# Patient Record
Sex: MALE | Race: WHITE | NOT HISPANIC OR LATINO | Employment: OTHER | URBAN - METROPOLITAN AREA
[De-identification: names, ages, dates, MRNs, and addresses within clinical notes are randomized per-mention and may not be internally consistent; named-entity substitution may affect disease eponyms.]

---

## 2024-08-15 ENCOUNTER — OFFICE VISIT (OUTPATIENT)
Dept: UROLOGY | Facility: CLINIC | Age: 84
End: 2024-08-15
Payer: MEDICARE

## 2024-08-15 VITALS
WEIGHT: 219.81 LBS | BODY MASS INDEX: 35.32 KG/M2 | HEIGHT: 66 IN | DIASTOLIC BLOOD PRESSURE: 70 MMHG | RESPIRATION RATE: 18 BRPM | HEART RATE: 66 BPM | SYSTOLIC BLOOD PRESSURE: 153 MMHG

## 2024-08-15 DIAGNOSIS — R31.0 GROSS HEMATURIA: Primary | ICD-10-CM

## 2024-08-15 DIAGNOSIS — N30.01 ACUTE CYSTITIS WITH HEMATURIA: ICD-10-CM

## 2024-08-15 DIAGNOSIS — N28.1 ACQUIRED CYST OF KIDNEY: ICD-10-CM

## 2024-08-15 DIAGNOSIS — N40.0 BPH WITHOUT URINARY OBSTRUCTION: ICD-10-CM

## 2024-08-15 DIAGNOSIS — R31.29 OTHER MICROSCOPIC HEMATURIA: ICD-10-CM

## 2024-08-15 DIAGNOSIS — R35.1 NOCTURIA: ICD-10-CM

## 2024-08-15 LAB
BILIRUB UR QL STRIP: POSITIVE
GLUCOSE UR QL STRIP: NEGATIVE
KETONES UR QL STRIP: POSITIVE
LEUKOCYTE ESTERASE UR QL STRIP: NEGATIVE
PH, POC UA: 5
POC BLOOD, URINE: POSITIVE
POC NITRATES, URINE: NEGATIVE
POC RESIDUAL URINE VOLUME: 95 ML (ref 0–100)
PROT UR QL STRIP: POSITIVE
SP GR UR STRIP: >=1.03 (ref 1–1.03)
UROBILINOGEN UR STRIP-ACNC: 0.2 (ref 0.3–2.2)

## 2024-08-15 PROCEDURE — 87086 URINE CULTURE/COLONY COUNT: CPT | Performed by: UROLOGY

## 2024-08-15 PROCEDURE — 99999 PR PBB SHADOW E&M-NEW PATIENT-LVL III: CPT | Mod: PBBFAC,,, | Performed by: UROLOGY

## 2024-08-15 NOTE — PROGRESS NOTES
Subjective:       Patient ID: Mason Holt is a 83 y.o. male.    Chief Complaint: Hematuria      History of Present Illness:     Mr Holt says he is having gross hematuria for the last 2 days.  He says he is not a smoker and he has no smoking history.  He says he is not taking a blood thinner.  No dysuria.  Nocturia X 2-3.  Normal urinary flow.  He has BPH and has a history of a TURP by a Urologist in [place] several years ago.        Hematuria  Pertinent negatives include no chills, dysuria, fever, flank pain, nausea or vomiting.        History reviewed. No pertinent past medical history.  No family history on file.  Social History     Socioeconomic History    Marital status:    Tobacco Use    Smoking status: Never    Smokeless tobacco: Never   Substance and Sexual Activity    Alcohol use: Never    Drug use: Never    Sexual activity: Yes     Partners: Female     Social Determinants of Health     Financial Resource Strain: Low Risk  (8/1/2023)    [facility]    Overall Financial Resource Strain (CARDIA)     Difficulty of Paying Living Expenses: Not hard at all    [facility]    Food Insecurity   Transportation Needs: No Transportation Needs (8/1/2023)    [facility]    PRAPARE - Transportation     Lack of Transportation (Medical): No     Lack of Transportation (Non-Medical): No   Physical Activity: Unknown (8/1/2023)    Received from [facility] and Affiliates    Exercise Vital Sign     Days of Exercise per Week: 0 days   Stress: No Stress Concern Present (8/1/2023)    Received from [facility] Its Subsidiaries and Affiliates    [employer] - Occupational Stress Questionnaire      "Feeling of Stress : Not at all    Received from [facility]    Housing Stability     No outpatient encounter medications on file as of 8/15/2024.     No facility-administered encounter medications on file as of 8/15/2024.        Review of Systems   Constitutional:  Negative for chills and fever.   Respiratory:  Negative for shortness of breath.    Cardiovascular:  Negative for chest pain.   Gastrointestinal:  Negative for nausea and vomiting.   Genitourinary:  Positive for hematuria. Negative for difficulty urinating, dysuria and flank pain.   Musculoskeletal:  Negative for back pain.   Skin:  Negative for rash.   Neurological:  Negative for dizziness.   Psychiatric/Behavioral:  Negative for agitation.        Objective:     BP (!) 153/70   Pulse 66   Resp 18   Ht 5' 6" (1.676 m)   Wt 99.7 kg (219 lb 12.8 oz)   BMI 35.48 kg/m²     Physical Exam  Constitutional:       Appearance: Normal appearance.   Pulmonary:      Effort: Pulmonary effort is normal.   Abdominal:      Palpations: Abdomen is soft.   Neurological:      Mental Status: He is alert and oriented to person, place, and time.   Psychiatric:         Mood and Affect: Mood normal.         Office Visit on 08/15/2024   Component Date Value Ref Range Status    POC Blood, Urine 08/15/2024 Positive (A)  Negative Final    POC Bilirubin, Urine 08/15/2024 Positive (A)  Negative Final    POC Urobilinogen, Urine 08/15/2024 0.2 (A)  0.3 - 2.2 Final    POC Ketones, Urine 08/15/2024 Positive (A)  Negative Final    POC Protein, Urine 08/15/2024 Positive (A)  Negative Final    POC Nitrates, Urine 08/15/2024 Negative  Negative Final    POC Glucose, Urine 08/15/2024 Negative  Negative Final    pH, UA 08/15/2024 5.0   Final    POC Specific Gravity, Urine 08/15/2024 >=1.030  1.003 - 1.029 Final    POC Leukocytes, Urine 08/15/2024 Negative  Negative Final    POC Residual Urine Volume 08/15/2024 95  0 - 100 mL Final        Results for orders placed or performed " in visit on 08/15/24 (from the past 8760 hour(s))   POCT Bladder Scan   Result Value    POC Residual Urine Volume 95        Assessment:       1. Gross hematuria    2. Acute cystitis with hematuria    3. Other microscopic hematuria    4. Nocturia    5. BPH without urinary obstruction    6. Acquired cyst of kidney      Plan:     Orders Placed This Encounter    CULTURE, URINE    POCT Urinalysis, Dipstick, Automated, W/O Scope    POCT Bladder Scan         6-10-24  MRI abdomen with and without IV contrast.  BRG.  See report.  Bilateral benign simple and proteinaceous renal cysts measuring up to 2.4 cm at the upper pole of the left kidney. The largest proteinaceous cysts are at the upper pole of the right kidney measuring up to 1.2 cm. No hydronephrosis.  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 Recommend one-year surveillance MRI.     10-31-23  CT abdomen/pelvis with IV contrast.  OLOL.  See report.  No suspicious renal lesion is noted. There are postop changes of TURP. No pelvic mass is detected.     I reviewed the above imaging results.         1-3-24  PSA 3.67    12-13-23  PSA 3.6    7-19-23  PSA 3.1    1-25-24  Cr 0.72    1-3-24  HgbA1c 5.9    I reviewed the above lab results.         Assessment:  - Gross hematuria for the last 2 days.  He says he is not a smoker and he has no smoking history.  He says he is not taking a blood thinner.  - Large amount of microscopic hematuria today on 8-15-24.  - BPH without significant LUTS.  History of a TURP by a Urologist in [place] several years ago.  - Nocturia.  - He says he has a history of kidney stones.    Plan:  - Urine culture today.  - I discussed the option of starting him on a prostate medication such as finasteride and he wants to hold off on starting on a prostate medication at this time.  - I discussed dietary modifications with him today and I recommended he drink mostly water during the day.  - I recommended he limit his  fluid intake at night to small amounts of water and to void just prior to bedtime.   - RTC on 8-26-24 for a cystoscopy.

## 2024-08-17 LAB — BACTERIA UR CULT: NORMAL

## 2024-08-26 ENCOUNTER — PROCEDURE VISIT (OUTPATIENT)
Facility: CLINIC | Age: 84
End: 2024-08-26
Payer: MEDICARE

## 2024-08-26 DIAGNOSIS — R31.0 GROSS HEMATURIA: ICD-10-CM

## 2024-08-26 DIAGNOSIS — R39.12 WEAK URINARY STREAM: ICD-10-CM

## 2024-08-26 DIAGNOSIS — N13.8 ENLARGED PROSTATE WITH URINARY OBSTRUCTION: Primary | ICD-10-CM

## 2024-08-26 DIAGNOSIS — Z01.818 PREOP TESTING: ICD-10-CM

## 2024-08-26 DIAGNOSIS — N40.1 ENLARGED PROSTATE WITH URINARY OBSTRUCTION: Primary | ICD-10-CM

## 2024-08-26 PROCEDURE — 88112 CYTOPATH CELL ENHANCE TECH: CPT | Performed by: PATHOLOGY

## 2024-08-26 PROCEDURE — 99214 OFFICE O/P EST MOD 30 MIN: CPT | Mod: 25,S$GLB,, | Performed by: UROLOGY

## 2024-08-26 PROCEDURE — 52000 CYSTOURETHROSCOPY: CPT | Mod: S$GLB,,, | Performed by: UROLOGY

## 2024-08-26 RX ORDER — FINASTERIDE 5 MG/1
5 TABLET, FILM COATED ORAL DAILY
Qty: 90 TABLET | Refills: 3 | Status: SHIPPED | OUTPATIENT
Start: 2024-08-26 | End: 2025-08-26

## 2024-08-26 RX ORDER — CEFAZOLIN SODIUM 2 G/50ML
2 SOLUTION INTRAVENOUS
OUTPATIENT
Start: 2024-08-26

## 2024-08-26 NOTE — PROCEDURES
Cystoscopy    Date/Time: 8/26/2024 10:30 AM    Performed by: Dave Mills MD  Authorized by: Dave Mills MD    Consent Done?:  Yes (Verbal) and Yes (Written)  Timeout: prior to procedure the correct patient, procedure, and site was verified    Prep: patient was prepped and draped in usual sterile fashion    Local anesthesia used?: Yes    Anesthesia:  Lidocaine jelly  Indications: hematuria    Position:  Supine  Anesthesia:  Lidocaine jelly  Preparation: Patient was prepped and draped in usual sterile fashion    Scope type:  Flexible cystoscope   patient tolerated the procedure well with no immediate complications  Comments:      The flexible cystoscope was advanced through his urethra into his bladder.  The bladder was inspected in a systematic fashion.  His bilateral ureteral orifices are orthotopic and patent with clear efflux of urine.  No bladder tumor, no urothelial lesion, no stone, and no foreign body is seen.  Mild bladder trabeculations.  His prostate is obstructing appearing and is vascular.  Moderately enlarged median lobe of his prostate.  Significantly enlarged lateral lobes of his prostate. A bladder wash urine cytology was obtained.  The cystoscope was removed from his bladder.  The patient tolerated this procedure well.             6-10-24  MRI abdomen with and without IV contrast.  BRG.  See report.  Bilateral benign simple and proteinaceous renal cysts measuring up to 2.4 cm at the upper pole of the left kidney. The largest proteinaceous cysts are at the upper pole of the right kidney measuring up to 1.2 cm. No hydronephrosis.  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 Recommend one-year surveillance MRI.      10-31-23  CT abdomen/pelvis with IV contrast.  OLOL.  See report.  No suspicious renal lesion is noted. There are postop changes of TURP. No pelvic mass is detected.     7-25-23  CT urogram.  MARIA INES.  See report.  5 mm non obstructing stone  within the right ureterovesical junction.  A couple of tiny punctate calyceal stones are visible in both kidneys.  Multiple cysts of varying size and proteinaceous content on both kidneys.  No definitive solid renal mass. Markedly enlarged prostate gland.     I reviewed the above imaging results.            1-3-24  PSA 3.67     12-13-23  PSA 3.6     7-19-23  PSA 3.1     1-25-24  Cr 0.72     1-3-24  HgbA1c 5.9     I reviewed the above lab results.            Assessment:  - Gross hematuria for a few days in mid August 2024 that has currently resolved.  Persistent microscopic hematuria.  Cystoscopy today on 8-26-24 shows an obstructing and vascular appearing trilobar hypertrophy prostate.  He is not a smoker and he has no smoking history.  He says he is not taking a blood thinner.  - BPH with LUTS.  History of a TURP in 2017 by an outside Urologist, Dr Gurjit Reyes in [place], [place].  30 grams of prostate tissue in the specimen and no prostate cancer was seen.  - Slow urinary stream.  - Nocturia.  - Prostate cancer.  He has been on active surveillance since 2010.  Prostate biopsy by an outside Urologist in 2010 showed a single core of jhon 3+2=5.  - History of a CT urogram on 7-25-23 that showed a 5 mm non obstructing stone within the right ureterovesical junction.  A couple of tiny punctate calyceal stones are visible in both kidneys.  S/p right USE with laser lithotripsy, cystoscopy, right ureteral stent placement by me on 8-2-23.  His right ureteral stent was removed cystoscopically by me on 8-9-23.  - History of kidney stones.     Plan:  - I had a discussion with the patient today on 8-26-24 after his cystoscopy his options regarding his BPH with LUTS and gross hematuria which likely is from the prostate including proceeding to the OR for a prostate procedure such as a transurethral laser vaporization of his prostate and/or transurethral resection of his prostate. Risks and benefits of the surgery was explained to  the patient today including but not limited to the risks of failure of the surgery with the continued urinary symptoms, and possibly worsening urinary symptoms, bleeding during or after surgery, infection, urinary incontinence, need for further surgery in the future, medical complications, and complications from Anesthesia.  I answered all of his questions to his apparent understanding and to his apparent satisfaction. The patient says that he would like to proceed with surgical intervention with a transurethral laser vaporization of his prostate with a possible need for a transurethral resection of his prostate depending on intraoperative findings.  - Started finasteride 5 mg 1 PO qdaily today on 8-26-24.  - Bladder wash urine cytology.  - I discussed dietary modifications with him today and I recommended he drink mostly water during the day.   - Preop testing this week.  - RTC on 9-9-24 with a PVR on arrival.

## 2024-08-27 DIAGNOSIS — Z01.818 PRE-OP EXAM: Primary | ICD-10-CM

## 2024-08-28 LAB
FINAL PATHOLOGIC DIAGNOSIS: ABNORMAL
Lab: ABNORMAL

## 2024-08-30 ENCOUNTER — OFFICE VISIT (OUTPATIENT)
Dept: INTERNAL MEDICINE | Facility: CLINIC | Age: 84
End: 2024-08-30
Payer: MEDICARE

## 2024-08-30 ENCOUNTER — HOSPITAL ENCOUNTER (OUTPATIENT)
Dept: CARDIOLOGY | Facility: HOSPITAL | Age: 84
Discharge: HOME | End: 2024-08-30
Attending: UROLOGY
Payer: MEDICARE

## 2024-08-30 ENCOUNTER — HOSPITAL ENCOUNTER (OUTPATIENT)
Dept: RADIOLOGY | Facility: HOSPITAL | Age: 84
Discharge: HOME | End: 2024-08-30
Attending: UROLOGY
Payer: MEDICARE

## 2024-08-30 VITALS
DIASTOLIC BLOOD PRESSURE: 68 MMHG | OXYGEN SATURATION: 96 % | RESPIRATION RATE: 18 BRPM | TEMPERATURE: 97 F | HEART RATE: 66 BPM | SYSTOLIC BLOOD PRESSURE: 156 MMHG

## 2024-08-30 DIAGNOSIS — N40.1 ENLARGED PROSTATE WITH URINARY OBSTRUCTION: Primary | ICD-10-CM

## 2024-08-30 DIAGNOSIS — Z01.818 PREOP TESTING: ICD-10-CM

## 2024-08-30 DIAGNOSIS — N13.8 ENLARGED PROSTATE WITH URINARY OBSTRUCTION: Primary | ICD-10-CM

## 2024-08-30 DIAGNOSIS — R03.0 BLOOD PRESSURE ELEVATED WITHOUT HISTORY OF HTN: ICD-10-CM

## 2024-08-30 DIAGNOSIS — Z01.818 PRE-OP EXAM: ICD-10-CM

## 2024-08-30 PROBLEM — N40.0 BPH (BENIGN PROSTATIC HYPERPLASIA): Status: ACTIVE | Noted: 2024-08-30

## 2024-08-30 LAB
OHS QRS DURATION: 96 MS
OHS QTC CALCULATION: 435 MS

## 2024-08-30 PROCEDURE — 71046 X-RAY EXAM CHEST 2 VIEWS: CPT | Mod: 26,,, | Performed by: RADIOLOGY

## 2024-08-30 PROCEDURE — 93010 ELECTROCARDIOGRAM REPORT: CPT | Mod: ,,, | Performed by: INTERNAL MEDICINE

## 2024-08-30 PROCEDURE — 71046 X-RAY EXAM CHEST 2 VIEWS: CPT | Mod: TC

## 2024-08-30 PROCEDURE — 93005 ELECTROCARDIOGRAM TRACING: CPT

## 2024-08-30 PROCEDURE — 99999 PR PBB SHADOW E&M-EST. PATIENT-LVL III: CPT | Mod: PBBFAC,,,

## 2024-08-30 NOTE — PROGRESS NOTES
Preoperative History and Physical                                                           [facility]                                                                   Chief Complaint: Preoperative evaluation     History of Present Illness:      Mason Holt is a 83 y.o. male who presents to the office today for a preoperative consultation at the request of Dr. Mills who plans on performing a TURP, USING THULIUM LASER on September 17.     Functional Status:      The patient is able to climb a flight of stairs. The patient is able to ambulate independently without difficulty. The patient's functional status is not affected by the surgical problem. The patient's functional status is not affected by shortness of breath, chest pain, dyspnea on exertion and fatigue.    MET score greater than 4    Patient Anesthesia History:      History of Malignant Hyperthermia: no  History of Pseudocholinesterase Deficiency: no  History of PONV: no  History of difficult intubation: no  History of delayed emergence: no  History of high fever after anesthesia: no    Family Anesthesia History:      History of Malignant Hyperthermia: no  History of Pseudocholinesterase Deficiency: no    Past Medical History:      Past Medical History:   Diagnosis Date    BPH (benign prostatic hyperplasia)         Past Surgical History:      Past Surgical History:   Procedure Laterality Date    BACK SURGERY  2020    - lower back surgery    CATARACT EXTRACTION Bilateral     L - 2024 and R 2022    EYE SURGERY      PROSTATE BIOPSY  2019        Social History:      Social History     Socioeconomic History    Marital status:    Tobacco Use    Smoking status: Never    Smokeless tobacco: Never   Substance and Sexual Activity    Alcohol use: Never    Drug use: Never    Sexual activity: Yes     Partners: Female     Social Determinants of Health     Financial Resource Strain: Low Risk  (8/1/2023)     [facility]    Overall Financial Resource Strain (CARDIA)     Difficulty of Paying Living Expenses: Not hard at all    Received from [facility]    Food Insecurity   Transportation Needs: No Transportation Needs (8/1/2023)    Received from [facility]    PRAPARE - Transportation     Lack of Transportation (Medical): No     Lack of Transportation (Non-Medical): No   Physical Activity: Unknown (8/1/2023)    Received from [facility]    Exercise Vital Sign     Days of Exercise per Week: 0 days   Stress: No Stress Concern Present (8/1/2023)    Received from [facility]    [employer] - Occupational Stress Questionnaire     Feeling of Stress : Not at all    Received from [facility]    Housing Stability        Family History:      No family history on file.    Allergies:      Review of patient's allergies indicates:  No Known Allergies    Medications:      Current Outpatient Medications   Medication Sig    finasteride (PROSCAR) 5 mg tablet Take 1 tablet (5 mg total) by mouth once daily.     No current facility-administered medications for this visit.       Vitals:      Vitals:    08/30/24 1320   BP: (!) 156/68   Pulse: 66   Resp: 18   Temp: 97.2 °F (36.2 °C)       Review of Systems:        Constitutional: Negative for fever, chills, weight loss, malaise/fatigue and diaphoresis.   HENT: Negative for hearing loss, ear pain, nosebleeds, congestion, sore throat, neck pain, tinnitus and ear discharge.    Eyes: Negative for blurred vision, double vision, photophobia, pain, discharge and redness.   Respiratory: Negative for cough, hemoptysis, sputum production,  shortness of breath, wheezing and stridor.    Cardiovascular: Negative for chest pain, palpitations, orthopnea, claudication, leg swelling and PND.   Gastrointestinal: Negative for heartburn, nausea, vomiting, abdominal pain, diarrhea, constipation, blood in stool and melena.   Genitourinary: Negative for dysuria, urgency, frequency, and flank pain. + hematuria    Musculoskeletal: Negative for myalgias, back pain, joint pain and falls.   Skin: Negative for itching and rash.   Neurological: Negative for dizziness, tingling, tremors, sensory change, speech change, focal weakness, seizures, loss of consciousness, weakness and headaches.   Endo/Heme/Allergies: Negative for environmental allergies and polydipsia. Does not bruise/bleed easily.   Psychiatric/Behavioral: Negative for depression, suicidal ideas, hallucinations, memory loss and substance abuse. The patient is not nervous/anxious and does not have insomnia.    All 14 systems reviewed and negative except as noted above.    Physical Exam:      Constitutional: Appears well-developed, well-nourished and in no acute distress.  Patient is oriented to person, place, and time.   Head: Normocephalic and atraumatic. Mucous membranes moist.  Neck: Neck supple no mass.   Cardiovascular: Normal rate and regular rhythm.  S1 S2 appreciated by ascultation.  Pulmonary/Chest: Effort normal and clear to auscultation bilaterally. No respiratory distress.   Abdomen: Soft. Non-tender and non-distended. Bowel sounds are normal.   Neurological: Patient is alert and oriented to person, place and time. Moves all extremities.  Skin: Warm and dry. No lesions.  Extremities: No clubbing, cyanosis or edema.    Laboratory data:      Reviewed and noted in plan where applicable. Please see chart for full laboratory data.    Lab Results   Component Value Date    INR 1.1 01/08/2024    INR 1.1 09/12/2023    INR 1.0 08/28/2023       Lab Results   Component Value Date    WBC 7.41 08/30/2024    HGB  13.5 (L) 08/30/2024    HCT 44.6 08/30/2024    MCV 97 08/30/2024     08/30/2024     Comprehensive Metabolic Panel  Order: [id number]  Status: Final result   Dx: Preop testing          Component Ref Range & Units 4 d ago 7 mo ago   Sodium 136 - 145 mmol/L 141 139   Potassium 3.5 - 5.1 mmol/L 3.7    Chloride 95 - 110 mmol/L 108 107 R   CO2 23 - 29 mmol/L 23 29 R   Glucose 70 - 110 mg/dL 136 High     BUN 8 - 23 mg/dL 14 15 R   Creatinine 0.5 - 1.4 mg/dL 0.9 0.72 R   Calcium 8.7 - 10.5 mg/dL 8.9 8.3 Low  R   Total Protein 6.0 - 8.4 g/dL 6.8    Albumin 3.5 - 5.2 g/dL 3.4 Low     Total Bilirubin 0.1 - 1.0 mg/dL 0.6    Comment: For infants and newborns, interpretation of results should be based  on gestational age, weight and in agreement with clinical  observations.    Premature Infant recommended reference ranges:  Up to 24 hours.............<8.0 mg/dL  Up to 48 hours............<12.0 mg/dL  3-5 days..................<15.0 mg/dL  6-29 days.................<15.0 mg/dL   Alkaline Phosphatase 55 - 135 U/L 110    AST 10 - 40 U/L 18    ALT 10 - 44 U/L 13    eGFR >60 mL/min/1.73 m^2 >60.0    Anion Gap 8 - 16 mmol/L 10 3 Low    Resulting Agency  [facility]        Specimen Collected: 08/30/24 13:49 CDT Last Resulted: 08/30/24 21:57 CDT     Predictors of intubation difficulty:       Morbid obesity? no   Anatomically abnormal facies? no   Prominent incisors? no   Receding mandible? no   Short, thick neck? no   Neck range of motion: normal   Dentition: No chipped, loose, or missing teeth.  Based on the Modified Mallampati, patient is a mallampati score: III (soft and hard palate and base of uvula visible)    Cardiographics:      ECG: Normal sinus rhythm -Normal ECG     Echocardiogram: not indicated    Imaging:      Chest x-ray: No confluent airspace opacity or consolidation.  There is no evidence of pleural effusion, pneumothorax, or other acute pulmonary disease.  The cardiomediastinal silhouette is within  normal limits.  No acute osseous abnormality is evident. Moderate scattered degenerative change and atherosclerotic disease. No acute cardiopulmonary abnormality per imaging on 08/30/2024.        Assessment and Plan:      1. Enlarged prostate with urinary obstruction  Assessment & Plan:  TURP, USING THULIUM LASER planned per Dr. Mills on 9/17/2024    Known risk factors for perioperative complications: None    Difficulty with intubation is not anticipated.    Cardiac Risk Estimation: Based on the Revised Cardiac Risk index, patient is a Class I risk with a 3.9 % risk of a major cardiac event in a low risk procedure.    1.) Preoperative workup as follows: chest x-ray, ECG, hemoglobin, hematocrit, electrolytes, creatinine, glucose.  2.) Change in medication regimen before surgery: discontinue ASA 6 days before surgery, discontinue NSAIDs 5 days before surgery, hold Metformin 24 hours prior to surgery. Hold all vitamins/supplements for 7 days prior to surgery, with the exception of Potassium and Iron supplementation. Hold semaglutide/tirzepatide for 7 days prior to surgery.   3.) Prophylaxis for cardiac events with perioperative beta-blockers: not indicated.  4.) Invasive hemodynamic monitoring perioperatively: at the discretion of anesthesiologist.  5.) Deep vein thrombosis prophylaxis postoperatively: regimen to be chosen by surgical team.  6.) Surveillance for postoperative MI with ECG immediately postoperatively and on postoperati ve days 1 and 2 AND troponin levels 24 hours postoperatively and on day 4 or hospital discharge (whichever comes first): at the discretion of anesthesiologist.  7.) Current medications which may produce withdrawal symptoms if withheld perioperatively: None   8.) Other measures: Postoperative incentive spirometry to prevent pneumonia.     --Morning of Procedure medications: None   --Hold all other medications morning of surgery   --Resume all medications post-operatively  --Hold ASA,  NSAIDs and all vitamins/supplements 7 days prior to procedure  --Hold oral diabetes medications morning of surgery   --Hold metformin 24 hours prior to surgery   --Hold Ozempic 7 days prior to surgery   -patient reports that he was prescribed finasteride but has not initiated the medication       2. Pre-op exam  -     Ambulatory referral/consult to Pre-Admit    3. Blood pressure elevated without history of HTN  Assessment & Plan:  -/68  -patient denies taking any antihypertensive medication and states his blood pressure is usually normal  -followed outpatient by PCP               Electronically signed by Cristy Conner NP on 9/3/2024 at 1:28 PM.     5

## 2024-08-30 NOTE — DISCHARGE INSTRUCTIONS
To confirm, your doctor has instructed you: Surgery is scheduled for 09/17/2024.   Pre admit office will call the afternoon prior to surgery between 1PM and 3PM with arrival time.    Surgery will be at [facility],  The address is [address]. [place] [address].      IMPORTANT INSTRUCTIONS!    Do not eat or drink after 12 midnight, including water. Do not smoke or use chewing tobacco after 12 midnight!  OK to brush teeth, but no gum, candy, or mints!      *Take only these medicines with a small swallow of water-morning of surgery*     No Meds         ____ Stop taking all vitamins, herbal supplements, Aspirin, & NSAIDS (Ibuprofen, Advil, Aleve) 7 days prior to surgery, as these can thin your blood.    ____ Weight loss medication, such as Adipex and Phentermine, must be stopped 14 days prior to surgery, no exceptions!    *Diabetic Patients: If you take diabetic or weight loss medication, do NOT take morning of surgery unless instructed by Doctor. Metformin to be stopped 24 hrs prior to surgery. DO NOT take short-acting insulin the day of surgery. Only take HALF of your regular dose of long-acting insulin the night before surgery, unless instructed otherwise. Blood sugars will be checked in pre-op.   ~Ozempic/Mounjaro/Wegovy/Trulicity/Semaglutide injections must be stopped 7 days prior to surgery.     Please notify MD office if you develop an active infection, are prescribed antibiotics by someone other than the surgeon doing your surgery, or visit urgent care/ER.      Bathing Instructions:   The night before surgery and the morning prior to coming to the hospital:    - Shower & rinse your body as usual with anti-bacterial Soap (Dial or Lever 2000)   -Hibiclens (if indicated) use AFTER anti-bacterial soap; 1 packet PM/1 packet in AM on surgical site only   -Do not use hibiclens on your head, face, or genitals.    -Do not wash with anti-bacterial soap after you use the hibiclens.    -Do not shave  surgical site 5-7 days prior to surgery.    -Pubic hair 7 days prior to surgery (OBGYN/Urology only).       Additional Instructions:   __ No makeup, powder, lotions, creams, or body spray to skin   __ No deodorant if you are having: breast procedure, PORT, or upper shoulder surgery!   __ No nail polish or artificial nails       **SURGERY WILL BE CANCELLED IF ARTIFICIAL/NAIL POLISH IS PRESENT!!!**  __ Please remove all piercings and leave all jewelry at home.    **SURGERY WILL BE CANCELLED IF PIERCINGS ARE PRESENT!!!**    __ Dentures, Hearing Aids and Contact Lens need to be removed prior to the start of surgery.    __ Avoid Alcoholic beverages 3 days prior to surgery, as it can thin the blood, unless told otherwise by pre-admit department.  __ Females: may need to give a urine sample the morning of surgery;   **Please ask  for a specimen cup if you need to use the restroom prior to being called into pre-op.**  __ Males: Stop ED medications (Viagra, Cialis) 24 hrs prior to surgery.  __ Wear clean, loose-fitting clothing. Allow for dressings/bandages/surgical equipment   __ You must have transportation, and they MUST stay the entire time.   __  Bring photo ID and insurance information to hospital      [facility] Visitor/Ride Policy:   Only 1 adult allowed (over the age of 18) to accompany you and MUST STAY through the entire length of admission.     -Must have a ride home from a responsible adult that you know and trust.    -Medical Transport, Uber or Lyft can only be used if patient has a responsible adult to accompany them during ride home.    ~Your ride MUST STAY the entire time until you are discharged~    *If you are running late or have questions the morning of surgery, please call the Pre-OP Department @ [phone].       *Please Call [facility] Pre-Admit Department for surgery instruction questions:   [phone]       Financial Questions:                                                       Additional Payment Question Numbers:   qhel: [phone]                                                               [phone] or [phone]

## 2024-08-30 NOTE — ASSESSMENT & PLAN NOTE
TURP, USING THULIUM LASER planned per Dr. Mills on 9/17/2024    Known risk factors for perioperative complications: None    Difficulty with intubation is not anticipated.    Cardiac Risk Estimation: Based on the Revised Cardiac Risk index, patient is a Class I risk with a 3.9 % risk of a major cardiac event in a low risk procedure.    1.) Preoperative workup as follows: chest x-ray, ECG, hemoglobin, hematocrit, electrolytes, creatinine, glucose.  2.) Change in medication regimen before surgery: discontinue ASA 6 days before surgery, discontinue NSAIDs 5 days before surgery, hold Metformin 24 hours prior to surgery. Hold all vitamins/supplements for 7 days prior to surgery, with the exception of Potassium and Iron supplementation. Hold semaglutide/tirzepatide for 7 days prior to surgery.   3.) Prophylaxis for cardiac events with perioperative beta-blockers: not indicated.  4.) Invasive hemodynamic monitoring perioperatively: at the discretion of anesthesiologist.  5.) Deep vein thrombosis prophylaxis postoperatively: regimen to be chosen by surgical team.  6.) Surveillance for postoperative MI with ECG immediately postoperatively and on postoperati ve days 1 and 2 AND troponin levels 24 hours postoperatively and on day 4 or hospital discharge (whichever comes first): at the discretion of anesthesiologist.  7.) Current medications which may produce withdrawal symptoms if withheld perioperatively: None   8.) Other measures: Postoperative incentive spirometry to prevent pneumonia.     --Morning of Procedure medications: None   --Hold all other medications morning of surgery   --Resume all medications post-operatively  --Hold ASA, NSAIDs and all vitamins/supplements 7 days prior to procedure  --Hold oral diabetes medications morning of surgery   --Hold metformin 24 hours prior to surgery   --Hold Ozempic 7 days prior to surgery   -patient reports that he was prescribed finasteride but has not initiated the medication

## 2024-08-30 NOTE — ASSESSMENT & PLAN NOTE
-/68  -patient denies taking any antihypertensive medication and states his blood pressure is usually normal  -followed outpatient by PCP

## 2024-09-01 LAB
OHS QRS DURATION: 96 MS
OHS QTC CALCULATION: 435 MS

## 2024-09-04 ENCOUNTER — PATIENT MESSAGE (OUTPATIENT)
Dept: UROLOGY | Facility: CLINIC | Age: 84
End: 2024-09-04
Payer: MEDICARE

## 2024-09-04 ENCOUNTER — TELEPHONE (OUTPATIENT)
Dept: UROLOGY | Facility: CLINIC | Age: 84
End: 2024-09-04
Payer: MEDICARE

## 2024-09-04 NOTE — TELEPHONE ENCOUNTER
I contacted patient no answer, I was unable to leave voicemail. I am sending patient a portal message via portal.     ----- Message from Dave Mills MD sent at 9/3/2024 12:19 PM CDT -----  Please call Mr Holt and let him know that I reviewed his preop labs and his labs look good from a surgery standpoint.  Make sure he knows the details of his preop appointment on 9-9-24.  Make sure he knows the details of his surgery on Tuesday 9-17-24 at 7 am at the [facility].  He will need to be at the [facility] at 5:15 am prior to his surgery and nothing to eat or drink for 8 hours prior to his surgery.  Ask him if he has any questions.

## 2024-09-05 ENCOUNTER — TELEPHONE (OUTPATIENT)
Dept: PREADMISSION TESTING | Facility: HOSPITAL | Age: 84
End: 2024-09-05
Payer: MEDICARE

## 2024-09-05 NOTE — TELEPHONE ENCOUNTER
Spoke with Dr. Cheema' staff in regards to cardiac clearance. Per staff, pt has not been seen since March and needs appointment. Staff said that next available was 09/10 at 11:15. Staff sent message to patient on [employer] to confirm appointment. Called and lvm with patient to notify him of cardiac clearance.

## 2024-09-06 ENCOUNTER — TELEPHONE (OUTPATIENT)
Dept: UROLOGY | Facility: CLINIC | Age: 84
End: 2024-09-06
Payer: MEDICARE

## 2024-09-09 ENCOUNTER — OFFICE VISIT (OUTPATIENT)
Facility: CLINIC | Age: 84
End: 2024-09-09
Payer: MEDICARE

## 2024-09-09 VITALS
SYSTOLIC BLOOD PRESSURE: 150 MMHG | HEART RATE: 66 BPM | WEIGHT: 219.81 LBS | RESPIRATION RATE: 16 BRPM | DIASTOLIC BLOOD PRESSURE: 60 MMHG | HEIGHT: 66 IN | BODY MASS INDEX: 35.32 KG/M2

## 2024-09-09 DIAGNOSIS — N13.8 ENLARGED PROSTATE WITH URINARY OBSTRUCTION: Primary | ICD-10-CM

## 2024-09-09 DIAGNOSIS — R39.12 POOR URINARY STREAM: ICD-10-CM

## 2024-09-09 DIAGNOSIS — R35.1 NOCTURIA: ICD-10-CM

## 2024-09-09 DIAGNOSIS — R31.29 OTHER MICROSCOPIC HEMATURIA: ICD-10-CM

## 2024-09-09 DIAGNOSIS — N40.1 ENLARGED PROSTATE WITH URINARY OBSTRUCTION: Primary | ICD-10-CM

## 2024-09-09 DIAGNOSIS — R31.0 GROSS HEMATURIA: ICD-10-CM

## 2024-09-09 PROCEDURE — 99215 OFFICE O/P EST HI 40 MIN: CPT | Mod: S$GLB,,, | Performed by: UROLOGY

## 2024-09-09 PROCEDURE — 1126F AMNT PAIN NOTED NONE PRSNT: CPT | Mod: CPTII,S$GLB,, | Performed by: UROLOGY

## 2024-09-09 PROCEDURE — 3288F FALL RISK ASSESSMENT DOCD: CPT | Mod: CPTII,S$GLB,, | Performed by: UROLOGY

## 2024-09-09 PROCEDURE — 3077F SYST BP >= 140 MM HG: CPT | Mod: CPTII,S$GLB,, | Performed by: UROLOGY

## 2024-09-09 PROCEDURE — 1101F PT FALLS ASSESS-DOCD LE1/YR: CPT | Mod: CPTII,S$GLB,, | Performed by: UROLOGY

## 2024-09-09 PROCEDURE — 3078F DIAST BP <80 MM HG: CPT | Mod: CPTII,S$GLB,, | Performed by: UROLOGY

## 2024-09-09 PROCEDURE — 99999 PR PBB SHADOW E&M-EST. PATIENT-LVL III: CPT | Mod: PBBFAC,,, | Performed by: UROLOGY

## 2024-09-09 RX ORDER — FINASTERIDE 5 MG/1
5 TABLET, FILM COATED ORAL DAILY
Qty: 90 TABLET | Refills: 3 | Status: SHIPPED | OUTPATIENT
Start: 2024-09-09 | End: 2025-09-09

## 2024-09-09 NOTE — H&P (VIEW-ONLY)
Subjective:       Patient ID: Mason Holt is a 83 y.o. male.    Chief Complaint: Pre-op Exam      History of Present Illness:     Mr Holt has BPH with LUTS.  He underwent a TURP in 2017 by an outside Urologist, Dr Gurjit Reyes in [place], [place].  30 grams of prostate tissue in the specimen and no prostate cancer was seen.    He has a slow urinary stream.  Nocturia X 3-4.  He says he did not get the finasteride 5 mg that I prescribed him on 8-26-24.  He had gross hematuria for a few days in mid August 2024.  He is having intermittent gross hematuria but none in the last few days.  Cystoscopy on 8-26-24 showed an obstructing and vascular appearing trilobar hypertrophy prostate.  He is not a smoker and he has no smoking history.  He is not taking a blood thinner.    He has low grade prostate cancer and he has been on active surveillance since 2010.  Prostate biopsy by an outside Urologist in 2010 showed a single core of jhon 3+2=5.         Past Medical History:   Diagnosis Date    BPH (benign prostatic hyperplasia)      Family History   Problem Relation Name Age of Onset    No Known Problems Father      No Known Problems Mother      No Known Problems Maternal Aunt      No Known Problems Maternal Uncle      No Known Problems Paternal Aunt      No Known Problems Paternal Uncle      No Known Problems Paternal Grandmother      No Known Problems Paternal Grandfather      No Known Problems Maternal Grandmother      No Known Problems Maternal Grandfather       Social History     Socioeconomic History    Marital status:    Tobacco Use    Smoking status: Never    Smokeless tobacco: Never   Substance and Sexual Activity    Alcohol use: Never    Drug use: Never    Sexual activity: Yes     Partners: Female     Social Determinants of Health     Financial Resource Strain: Low Risk  (8/1/2023)    [facility]    Overall Financial  "Resource Strain (CARDIA)     Difficulty of Paying Living Expenses: Not hard at all    Received from [facility]    Food Insecurity   Transportation Needs: No Transportation Needs (8/1/2023)    [facility]    PRAPARE - Transportation     Lack of Transportation (Medical): No     Lack of Transportation (Non-Medical): No   Physical Activity: Unknown (8/1/2023)    [facility]    Exercise Vital Sign     Days of Exercise per Week: 0 days   Stress: No Stress Concern Present (8/1/2023)    [facility]    [place] [employer] - Occupational Stress Questionnaire     Feeling of Stress : Not at all    Received from [facility]    Housing Stability     Outpatient Encounter Medications as of 9/9/2024   Medication Sig Dispense Refill    finasteride (PROSCAR) 5 mg tablet Take 1 tablet (5 mg total) by mouth once daily. (Patient not taking: Reported on 9/9/2024) 90 tablet 3    finasteride (PROSCAR) 5 mg tablet Take 1 tablet (5 mg total) by mouth once daily. 90 tablet 3     No facility-administered encounter medications on file as of 9/9/2024.        Review of Systems   Constitutional:  Negative for chills and fever.   Respiratory:  Negative for shortness of breath.    Cardiovascular:  Negative for chest pain.   Gastrointestinal:  Negative for nausea and vomiting.   Genitourinary:  Positive for difficulty urinating and hematuria.   Musculoskeletal:  Negative for back pain.   Skin:  Negative for rash.   Neurological:  Negative for dizziness.   Psychiatric/Behavioral:  Negative for agitation.        Objective:     BP (!) 150/60 (BP Location: Left arm, Patient Position: Sitting)   Pulse 66   Resp 16   Ht 5' 6" (1.676 m)   Wt " 99.7 kg (219 lb 12.8 oz)   BMI 35.48 kg/m²     Physical Exam  Constitutional:       Appearance: Normal appearance.   Cardiovascular:      Rate and Rhythm: Normal rate.   Pulmonary:      Effort: Pulmonary effort is normal.   Abdominal:      Palpations: Abdomen is soft.   Neurological:      Mental Status: He is alert and oriented to person, place, and time.   Psychiatric:         Mood and Affect: Mood normal.         No visits with results within 1 Day(s) from this visit.   Latest known visit with results is:   Hospital Outpatient Visit on 08/30/2024   Component Date Value Ref Range Status    QRS Duration 08/30/2024 96  ms Final    [facility] QTC Calculation 08/30/2024 435  ms Final        Results for orders placed or performed in visit on 08/15/24 (from the past 8760 hour(s))   POCT Bladder Scan   Result Value    POC Residual Urine Volume 95        Assessment:       1. Enlarged prostate with urinary obstruction    2. Poor urinary stream    3. Nocturia    4. Other microscopic hematuria    5. Gross hematuria      Plan:     Orders Placed This Encounter    finasteride (PROSCAR) 5 mg tablet          6-10-24  MRI abdomen with and without IV contrast.  BRG.  See report.  Bilateral benign simple and proteinaceous renal cysts measuring up to 2.4 cm at the upper pole of the left kidney. The largest proteinaceous cysts are at the upper pole of the right kidney measuring up to 1.2 cm. No hydronephrosis.  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 Recommend one-year surveillance MRI.      10-31-23  CT abdomen/pelvis with IV contrast.  OLOL.  See report.  No suspicious renal lesion is noted. There are postop changes of TURP. No pelvic mass is detected.      7-25-23  CT urogram.  MARIA INES.  See report.  5 mm non obstructing stone within the right ureterovesical junction.  A couple of tiny punctate calyceal stones are visible in both kidneys.  Multiple cysts of varying size and proteinaceous content on  both kidneys.  No definitive solid renal mass. Markedly enlarged prostate gland.     I reviewed the above imaging results.            1-3-24  PSA 3.67     12-13-23  PSA 3.6     7-19-23  PSA 3.1    8-30-24  Cr 0.9.  GFR >60.     1-25-24  Cr 0.72     1-3-24  HgbA1c 5.9     I reviewed the above lab results.            Assessment:  - BPH with LUTS.  History of a TURP in 2017 by an outside Urologist, Dr Gurjit Reyes in [place], [place].  30 grams of prostate tissue in the specimen and no prostate cancer was seen.  - Slow urinary stream.  - Nocturia.  - Gross hematuria for a few days in mid August 2024.  Intermittent gross hematuria but none in the last few days.  Persistent microscopic hematuria.  Cystoscopy on 8-26-24 showed an obstructing and vascular appearing trilobar hypertrophy prostate.  He is not a smoker and he has no smoking history.  He is not taking a blood thinner.  - Prostate cancer.  He has been on active surveillance since 2010.  Prostate biopsy by an outside Urologist in 2010 showed a single core of jhon 3+2=5.  - History of a CT urogram on 7-25-23 that showed a 5 mm non obstructing stone within the right ureterovesical junction.  A couple of tiny punctate calyceal stones are visible in both kidneys.  S/p right USE with laser lithotripsy, cystoscopy, right ureteral stent placement by me on 8-2-23.  His right ureteral stent was removed cystoscopically by me on 8-9-23.  - History of kidney stones.     Plan:  - I had another discussion with the patient today of his options regarding his BPH with LUTS and gross hematuria which likely is from the prostate including proceeding to the OR for a prostate procedure such as a transurethral laser vaporization of his prostate and/or transurethral resection of his prostate. Risks and benefits of the surgery was explained to the patient today including but not limited to the risks of failure of the surgery with the continued urinary symptoms, and possibly worsening  urinary symptoms, bleeding during or after surgery, infection, urinary incontinence, need for further surgery in the future, medical complications, and complications from Anesthesia.  I answered all of his questions to his apparent understanding and to his apparent satisfaction. The patient says that he wants to proceed with surgical intervention with a transurethral laser vaporization of his prostate with a possible need for a transurethral resection of his prostate depending on intraoperative findings and any other indicated procedures.  Surgery consent was signed by the patient today.  - The mutual decision was made to continue with active surveillance of his prostate cancer.  - I prescribed him finasteride 5 mg 1 PO qdaily again today on 9-9-24.  - I discussed dietary modifications with him today and I recommended he drink mostly water during the day.

## 2024-09-09 NOTE — PROGRESS NOTES
Subjective:       Patient ID: Mason Holt is a 83 y.o. male.    Chief Complaint: Pre-op Exam      History of Present Illness:     Mr Holt has BPH with LUTS.  He underwent a TURP in 2017 by an outside Urologist, Dr Gurjit Reyes in [place], [place].  30 grams of prostate tissue in the specimen and no prostate cancer was seen.    He has a slow urinary stream.  Nocturia X 3-4.  He says he did not get the finasteride 5 mg that I prescribed him on 8-26-24.  He had gross hematuria for a few days in mid August 2024.  He is having intermittent gross hematuria but none in the last few days.  Cystoscopy on 8-26-24 showed an obstructing and vascular appearing trilobar hypertrophy prostate.  He is not a smoker and he has no smoking history.  He is not taking a blood thinner.    He has low grade prostate cancer and he has been on active surveillance since 2010.  Prostate biopsy by an outside Urologist in 2010 showed a single core of jhon 3+2=5.         Past Medical History:   Diagnosis Date    BPH (benign prostatic hyperplasia)      Family History   Problem Relation Name Age of Onset    No Known Problems Father      No Known Problems Mother      No Known Problems Maternal Aunt      No Known Problems Maternal Uncle      No Known Problems Paternal Aunt      No Known Problems Paternal Uncle      No Known Problems Paternal Grandmother      No Known Problems Paternal Grandfather      No Known Problems Maternal Grandmother      No Known Problems Maternal Grandfather       Social History     Socioeconomic History    Marital status:    Tobacco Use    Smoking status: Never    Smokeless tobacco: Never   Substance and Sexual Activity    Alcohol use: Never    Drug use: Never    Sexual activity: Yes     Partners: Female     Social Determinants of Health     Financial Resource Strain: Low Risk  (8/1/2023)    [facility] and Affiliates    Overall Financial  "Resource Strain (CARDIA)     Difficulty of Paying Living Expenses: Not hard at all    Received from Health system    Food Insecurity   Transportation Needs: No Transportation Needs (8/1/2023)    [facility]    PRAPARE - Transportation     Lack of Transportation (Medical): No     Lack of Transportation (Non-Medical): No   Physical Activity: Unknown (8/1/2023)    [facility]    Exercise Vital Sign     Days of Exercise per Week: 0 days   Stress: No Stress Concern Present (8/1/2023)    [facility]    [employer] - Occupational Stress Questionnaire     Feeling of Stress : Not at all    Received from Health system    Housing Stability     Outpatient Encounter Medications as of 9/9/2024   Medication Sig Dispense Refill    finasteride (PROSCAR) 5 mg tablet Take 1 tablet (5 mg total) by mouth once daily. (Patient not taking: Reported on 9/9/2024) 90 tablet 3    finasteride (PROSCAR) 5 mg tablet Take 1 tablet (5 mg total) by mouth once daily. 90 tablet 3     No facility-administered encounter medications on file as of 9/9/2024.        Review of Systems   Constitutional:  Negative for chills and fever.   Respiratory:  Negative for shortness of breath.    Cardiovascular:  Negative for chest pain.   Gastrointestinal:  Negative for nausea and vomiting.   Genitourinary:  Positive for difficulty urinating and hematuria.   Musculoskeletal:  Negative for back pain.   Skin:  Negative for rash.   Neurological:  Negative for dizziness.   Psychiatric/Behavioral:  Negative for agitation.        Objective:     BP (!) 150/60 (BP Location: Left arm, Patient Position: Sitting)   Pulse 66   Resp 16   Ht 5' 6" (1.676 m)   Wt " 99.7 kg (219 lb 12.8 oz)   BMI 35.48 kg/m²     Physical Exam  Constitutional:       Appearance: Normal appearance.   Cardiovascular:      Rate and Rhythm: Normal rate.   Pulmonary:      Effort: Pulmonary effort is normal.   Abdominal:      Palpations: Abdomen is soft.   Neurological:      Mental Status: He is alert and oriented to person, place, and time.   Psychiatric:         Mood and Affect: Mood normal.         No visits with results within 1 Day(s) from this visit.   Latest known visit with results is:   Hospital Outpatient Visit on 08/30/2024   Component Date Value Ref Range Status    QRS Duration 08/30/2024 96  ms Final    [facility] QTC Calculation 08/30/2024 435  ms Final        Results for orders placed or performed in visit on 08/15/24 (from the past 8760 hour(s))   POCT Bladder Scan   Result Value    POC Residual Urine Volume 95        Assessment:       1. Enlarged prostate with urinary obstruction    2. Poor urinary stream    3. Nocturia    4. Other microscopic hematuria    5. Gross hematuria      Plan:     Orders Placed This Encounter    finasteride (PROSCAR) 5 mg tablet          6-10-24  MRI abdomen with and without IV contrast.  BRG.  See report.  Bilateral benign simple and proteinaceous renal cysts measuring up to 2.4 cm at the upper pole of the left kidney. The largest proteinaceous cysts are at the upper pole of the right kidney measuring up to 1.2 cm. No hydronephrosis.  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 Recommend one-year surveillance MRI.      10-31-23  CT abdomen/pelvis with IV contrast.  OLOL.  See report.  No suspicious renal lesion is noted. There are postop changes of TURP. No pelvic mass is detected.      7-25-23  CT urogram.  MARIA INES.  See report.  5 mm non obstructing stone within the right ureterovesical junction.  A couple of tiny punctate calyceal stones are visible in both kidneys.  Multiple cysts of varying size and proteinaceous content on  both kidneys.  No definitive solid renal mass. Markedly enlarged prostate gland.     I reviewed the above imaging results.            1-3-24  PSA 3.67     12-13-23  PSA 3.6     7-19-23  PSA 3.1    8-30-24  Cr 0.9.  GFR >60.     1-25-24  Cr 0.72     1-3-24  HgbA1c 5.9     I reviewed the above lab results.            Assessment:  - BPH with LUTS.  History of a TURP in 2017 by an outside Urologist, Dr Gurjit Reyes in [place], [place].  30 grams of prostate tissue in the specimen and no prostate cancer was seen.  - Slow urinary stream.  - Nocturia.  - Gross hematuria for a few days in mid August 2024.  Intermittent gross hematuria but none in the last few days.  Persistent microscopic hematuria.  Cystoscopy on 8-26-24 showed an obstructing and vascular appearing trilobar hypertrophy prostate.  He is not a smoker and he has no smoking history.  He is not taking a blood thinner.  - Prostate cancer.  He has been on active surveillance since 2010.  Prostate biopsy by an outside Urologist in 2010 showed a single core of jhon 3+2=5.  - History of a CT urogram on 7-25-23 that showed a 5 mm non obstructing stone within the right ureterovesical junction.  A couple of tiny punctate calyceal stones are visible in both kidneys.  S/p right USE with laser lithotripsy, cystoscopy, right ureteral stent placement by me on 8-2-23.  His right ureteral stent was removed cystoscopically by me on 8-9-23.  - History of kidney stones.     Plan:  - I had another discussion with the patient today of his options regarding his BPH with LUTS and gross hematuria which likely is from the prostate including proceeding to the OR for a prostate procedure such as a transurethral laser vaporization of his prostate and/or transurethral resection of his prostate. Risks and benefits of the surgery was explained to the patient today including but not limited to the risks of failure of the surgery with the continued urinary symptoms, and possibly worsening  urinary symptoms, bleeding during or after surgery, infection, urinary incontinence, need for further surgery in the future, medical complications, and complications from Anesthesia.  I answered all of his questions to his apparent understanding and to his apparent satisfaction. The patient says that he wants to proceed with surgical intervention with a transurethral laser vaporization of his prostate with a possible need for a transurethral resection of his prostate depending on intraoperative findings and any other indicated procedures.  Surgery consent was signed by the patient today.  - The mutual decision was made to continue with active surveillance of his prostate cancer.  - I prescribed him finasteride 5 mg 1 PO qdaily again today on 9-9-24.  - I discussed dietary modifications with him today and I recommended he drink mostly water during the day.

## 2024-09-16 ENCOUNTER — ANESTHESIA EVENT (OUTPATIENT)
Dept: SURGERY | Facility: HOSPITAL | Age: 84
End: 2024-09-16
Payer: MEDICARE

## 2024-09-16 NOTE — ANESTHESIA PREPROCEDURE EVALUATION
09/16/2024  Mason Holt is a 83 y.o., male.  Past Medical History:   Diagnosis Date    BPH (benign prostatic hyperplasia)      Past Surgical History:   Procedure Laterality Date    BACK SURGERY  2020    - lower back surgery    CATARACT EXTRACTION Bilateral     L - 2024 and R 2022    EYE SURGERY      PROSTATE BIOPSY  2019         Pre-op Assessment    I have reviewed the Patient Summary Reports.    I have reviewed the NPO Status.   I have reviewed the Medications.     Review of Systems  Anesthesia Hx:   History of prior surgery of interest to airway management or planning:            Denies Personal Hx of Anesthesia complications.                    Social:  Non-Smoker       Hematology/Oncology:       -- Anemia:                                  Cardiovascular:  Cardiovascular Normal                                            Pulmonary:  Pulmonary Normal                       Renal/:    BPH              Hepatic/GI:  Hepatic/GI Normal                 Endocrine:  Endocrine Normal                Physical Exam  General: Well nourished    Airway:  Mallampati: II   Mouth Opening: Normal  TM Distance: Normal  Tongue: Normal  Neck ROM: Normal ROM    Dental:  Intact        Anesthesia Plan  Type of Anesthesia, risks & benefits discussed:    Anesthesia Type: Gen Supraglottic Airway, Gen Natural Airway, MAC, Gen ETT  Intra-op Monitoring Plan: Standard ASA Monitors  Post Op Pain Control Plan: multimodal analgesia and IV/PO Opioids PRN  Induction:  IV  Informed Consent: Informed consent signed with the Patient and all parties understand the risks and agree with anesthesia plan.  All questions answered. Patient consented to blood products? No  ASA Score: 2  Day of Surgery Review of History & Physical: H&P Update referred to the surgeon/provider.    Ready For Surgery From Anesthesia Perspective.     .

## 2024-09-17 ENCOUNTER — ANESTHESIA (OUTPATIENT)
Dept: SURGERY | Facility: HOSPITAL | Age: 84
End: 2024-09-17
Payer: MEDICARE

## 2024-09-17 ENCOUNTER — HOSPITAL ENCOUNTER (OUTPATIENT)
Facility: HOSPITAL | Age: 84
Discharge: HOME | End: 2024-09-17
Attending: UROLOGY | Admitting: UROLOGY
Payer: MEDICARE

## 2024-09-17 VITALS
OXYGEN SATURATION: 95 % | SYSTOLIC BLOOD PRESSURE: 157 MMHG | TEMPERATURE: 98 F | WEIGHT: 218.81 LBS | BODY MASS INDEX: 35.17 KG/M2 | HEIGHT: 66 IN | DIASTOLIC BLOOD PRESSURE: 73 MMHG | HEART RATE: 73 BPM | RESPIRATION RATE: 16 BRPM

## 2024-09-17 DIAGNOSIS — N13.8 ENLARGED PROSTATE WITH URINARY OBSTRUCTION: ICD-10-CM

## 2024-09-17 DIAGNOSIS — N40.1 ENLARGED PROSTATE WITH URINARY OBSTRUCTION: ICD-10-CM

## 2024-09-17 PROCEDURE — 63600175 PHARM REV CODE 636 W HCPCS: Performed by: NURSE ANESTHETIST, CERTIFIED REGISTERED

## 2024-09-17 PROCEDURE — 27200651 HC AIRWAY, LMA: Performed by: ANESTHESIOLOGY

## 2024-09-17 PROCEDURE — 52648 LASER SURGERY OF PROSTATE: CPT | Mod: ,,, | Performed by: UROLOGY

## 2024-09-17 PROCEDURE — 25000003 PHARM REV CODE 250: Performed by: NURSE ANESTHETIST, CERTIFIED REGISTERED

## 2024-09-17 PROCEDURE — 25000003 PHARM REV CODE 250: Performed by: UROLOGY

## 2024-09-17 RX ORDER — LIDOCAINE HYDROCHLORIDE 20 MG/ML
INJECTION INTRAVENOUS
Status: DISCONTINUED | OUTPATIENT
Start: 2024-09-17 | End: 2024-09-17

## 2024-09-17 RX ORDER — MEPERIDINE HYDROCHLORIDE 25 MG/ML
12.5 INJECTION INTRAMUSCULAR; INTRAVENOUS; SUBCUTANEOUS ONCE AS NEEDED
Status: DISCONTINUED | OUTPATIENT
Start: 2024-09-17 | End: 2024-09-17 | Stop reason: HOSPADM

## 2024-09-17 RX ORDER — CIPROFLOXACIN 2 MG/ML
400 INJECTION, SOLUTION INTRAVENOUS
Status: DISCONTINUED | OUTPATIENT
Start: 2024-09-17 | End: 2024-09-17 | Stop reason: HOSPADM

## 2024-09-17 RX ORDER — SODIUM CHLORIDE 0.9 % (FLUSH) 0.9 %
10 SYRINGE (ML) INJECTION
Status: DISCONTINUED | OUTPATIENT
Start: 2024-09-17 | End: 2024-09-17 | Stop reason: HOSPADM

## 2024-09-17 RX ORDER — EPHEDRINE SULFATE 50 MG/ML
INJECTION, SOLUTION INTRAVENOUS
Status: DISCONTINUED | OUTPATIENT
Start: 2024-09-17 | End: 2024-09-17

## 2024-09-17 RX ORDER — ACETAMINOPHEN 325 MG/1
650 TABLET ORAL EVERY 4 HOURS PRN
Status: DISCONTINUED | OUTPATIENT
Start: 2024-09-17 | End: 2024-09-17 | Stop reason: HOSPADM

## 2024-09-17 RX ORDER — HYDROCODONE BITARTRATE AND ACETAMINOPHEN 5; 325 MG/1; MG/1
1 TABLET ORAL EVERY 6 HOURS PRN
Status: DISCONTINUED | OUTPATIENT
Start: 2024-09-17 | End: 2024-09-17 | Stop reason: HOSPADM

## 2024-09-17 RX ORDER — HYDROMORPHONE HYDROCHLORIDE 2 MG/ML
1 INJECTION, SOLUTION INTRAMUSCULAR; INTRAVENOUS; SUBCUTANEOUS EVERY 6 HOURS PRN
Status: DISCONTINUED | OUTPATIENT
Start: 2024-09-17 | End: 2024-09-17 | Stop reason: HOSPADM

## 2024-09-17 RX ORDER — PHENAZOPYRIDINE HYDROCHLORIDE 100 MG/1
100 TABLET, FILM COATED ORAL 3 TIMES DAILY PRN
Qty: 30 TABLET | Refills: 1 | Status: SHIPPED | OUTPATIENT
Start: 2024-09-17 | End: 2024-09-27

## 2024-09-17 RX ORDER — DEXMEDETOMIDINE HYDROCHLORIDE 100 UG/ML
INJECTION, SOLUTION INTRAVENOUS
Status: DISCONTINUED | OUTPATIENT
Start: 2024-09-17 | End: 2024-09-17

## 2024-09-17 RX ORDER — FENTANYL CITRATE 50 UG/ML
25 INJECTION, SOLUTION INTRAMUSCULAR; INTRAVENOUS EVERY 5 MIN PRN
Status: DISCONTINUED | OUTPATIENT
Start: 2024-09-17 | End: 2024-09-17 | Stop reason: HOSPADM

## 2024-09-17 RX ORDER — OXYCODONE AND ACETAMINOPHEN 5; 325 MG/1; MG/1
1 TABLET ORAL
Status: DISCONTINUED | OUTPATIENT
Start: 2024-09-17 | End: 2024-09-17 | Stop reason: HOSPADM

## 2024-09-17 RX ORDER — SODIUM CHLORIDE, SODIUM LACTATE, POTASSIUM CHLORIDE, CALCIUM CHLORIDE 600; 310; 30; 20 MG/100ML; MG/100ML; MG/100ML; MG/100ML
INJECTION, SOLUTION INTRAVENOUS CONTINUOUS PRN
Status: DISCONTINUED | OUTPATIENT
Start: 2024-09-17 | End: 2024-09-17

## 2024-09-17 RX ORDER — ONDANSETRON 4 MG/1
4 TABLET, ORALLY DISINTEGRATING ORAL EVERY 6 HOURS PRN
Status: DISCONTINUED | OUTPATIENT
Start: 2024-09-17 | End: 2024-09-17 | Stop reason: HOSPADM

## 2024-09-17 RX ORDER — LIDOCAINE HYDROCHLORIDE 20 MG/ML
JELLY TOPICAL
Status: DISCONTINUED | OUTPATIENT
Start: 2024-09-17 | End: 2024-09-17 | Stop reason: HOSPADM

## 2024-09-17 RX ORDER — PHENAZOPYRIDINE HYDROCHLORIDE 100 MG/1
100 TABLET, FILM COATED ORAL 3 TIMES DAILY PRN
Status: CANCELLED | OUTPATIENT
Start: 2024-09-17

## 2024-09-17 RX ORDER — TALC
6 POWDER (GRAM) TOPICAL NIGHTLY PRN
Status: DISCONTINUED | OUTPATIENT
Start: 2024-09-17 | End: 2024-09-17 | Stop reason: HOSPADM

## 2024-09-17 RX ORDER — GLUCAGON 1 MG
1 KIT INJECTION
Status: DISCONTINUED | OUTPATIENT
Start: 2024-09-17 | End: 2024-09-17 | Stop reason: HOSPADM

## 2024-09-17 RX ORDER — LIDOCAINE HYDROCHLORIDE 20 MG/ML
JELLY TOPICAL
Status: DISCONTINUED
Start: 2024-09-17 | End: 2024-09-17 | Stop reason: HOSPADM

## 2024-09-17 RX ORDER — ONDANSETRON HYDROCHLORIDE 2 MG/ML
INJECTION, SOLUTION INTRAVENOUS
Status: DISCONTINUED | OUTPATIENT
Start: 2024-09-17 | End: 2024-09-17

## 2024-09-17 RX ORDER — ACETAMINOPHEN 325 MG/1
650 TABLET ORAL EVERY 8 HOURS PRN
Status: DISCONTINUED | OUTPATIENT
Start: 2024-09-17 | End: 2024-09-17 | Stop reason: HOSPADM

## 2024-09-17 RX ORDER — FINASTERIDE 5 MG/1
5 TABLET, FILM COATED ORAL DAILY
Status: CANCELLED | OUTPATIENT
Start: 2024-09-17

## 2024-09-17 RX ORDER — ONDANSETRON HYDROCHLORIDE 2 MG/ML
4 INJECTION, SOLUTION INTRAVENOUS DAILY PRN
Status: DISCONTINUED | OUTPATIENT
Start: 2024-09-17 | End: 2024-09-17 | Stop reason: HOSPADM

## 2024-09-17 RX ORDER — FENTANYL CITRATE 50 UG/ML
INJECTION, SOLUTION INTRAMUSCULAR; INTRAVENOUS
Status: DISCONTINUED | OUTPATIENT
Start: 2024-09-17 | End: 2024-09-17

## 2024-09-17 RX ORDER — DEXAMETHASONE SODIUM PHOSPHATE 4 MG/ML
INJECTION, SOLUTION INTRA-ARTICULAR; INTRALESIONAL; INTRAMUSCULAR; INTRAVENOUS; SOFT TISSUE
Status: DISCONTINUED | OUTPATIENT
Start: 2024-09-17 | End: 2024-09-17

## 2024-09-17 RX ORDER — PROPOFOL 10 MG/ML
INJECTION, EMULSION INTRAVENOUS
Status: DISCONTINUED | OUTPATIENT
Start: 2024-09-17 | End: 2024-09-17

## 2024-09-17 RX ORDER — HYDROCODONE BITARTRATE AND ACETAMINOPHEN 10; 325 MG/1; MG/1
1 TABLET ORAL EVERY 6 HOURS PRN
Status: DISCONTINUED | OUTPATIENT
Start: 2024-09-17 | End: 2024-09-17 | Stop reason: HOSPADM

## 2024-09-17 RX ORDER — LIDOCAINE HYDROCHLORIDE 10 MG/ML
1 INJECTION, SOLUTION EPIDURAL; INFILTRATION; INTRACAUDAL; PERINEURAL ONCE AS NEEDED
Status: DISCONTINUED | OUTPATIENT
Start: 2024-09-17 | End: 2024-09-17 | Stop reason: HOSPADM

## 2024-09-17 RX ADMIN — DEXAMETHASONE SODIUM PHOSPHATE 4 MG: 4 INJECTION, SOLUTION INTRA-ARTICULAR; INTRALESIONAL; INTRAMUSCULAR; INTRAVENOUS; SOFT TISSUE at 07:09

## 2024-09-17 RX ADMIN — SODIUM CHLORIDE, POTASSIUM CHLORIDE, SODIUM LACTATE AND CALCIUM CHLORIDE: 600; 310; 30; 20 INJECTION, SOLUTION INTRAVENOUS at 08:09

## 2024-09-17 RX ADMIN — EPHEDRINE SULFATE 20 MG: 50 INJECTION INTRAVENOUS at 07:09

## 2024-09-17 RX ADMIN — PROPOFOL 100 MG: 10 INJECTION, EMULSION INTRAVENOUS at 07:09

## 2024-09-17 RX ADMIN — DEXMEDETOMIDINE HYDROCHLORIDE 4 MCG: 100 INJECTION, SOLUTION INTRAVENOUS at 07:09

## 2024-09-17 RX ADMIN — FENTANYL CITRATE 25 MCG: 50 INJECTION, SOLUTION INTRAMUSCULAR; INTRAVENOUS at 07:09

## 2024-09-17 RX ADMIN — LIDOCAINE HYDROCHLORIDE 50 MG: 20 INJECTION INTRAVENOUS at 07:09

## 2024-09-17 RX ADMIN — ONDANSETRON 4 MG: 2 INJECTION INTRAMUSCULAR; INTRAVENOUS at 07:09

## 2024-09-17 RX ADMIN — SODIUM CHLORIDE, POTASSIUM CHLORIDE, SODIUM LACTATE AND CALCIUM CHLORIDE: 600; 310; 30; 20 INJECTION, SOLUTION INTRAVENOUS at 07:09

## 2024-09-17 NOTE — OP NOTE
Surgeon:  Dr Dave Mills.    Date:  9-.    Pre operative diagnosis:  1) Benign prostatic hyperplasia with urinary obstruction.  2) Gross hematuria.    Post operative diagnosis:  1) Benign prostatic hyperplasia with urinary obstruction.  2) Gross hematuria.    Surgery:  Transurethral laser vaporization of his prostate.    Anesthesia:  General.    Estimated blood loss:  5 ml.    Complications:  None.    Specimens:  None.    Procedure in details:  Risks and benefits of the surgery were explained to the patient and his family prior to the surgery and the patient and he expressed understanding.  All questions were answered by me to the patient's apparent understanding and to the patient's apparent satisfaction.  The patient wants to proceed with the surgery.  Surgery consent was signed by the patient prior to the surgery.  The patient was taken to the OR and placed in the supine position initially.  Anesthesia was administered by the Anesthesia team.  The patient was  then placed in the dorsal lithotomy position.  The patient was prepped and drapped in the usual sterile fashion.  An IV antibiotic was given to the patient prior to the surgery.  A timeout was done prior to the surgery.  A 22 F rigid cystoscope with continuous normal saline flow was advanced through his urethra into his bladder.  The bladder was inspected in a systematic fashion.   His bilateral ureteral orifices are orthotopic and patent.  No bladder tumors, no urothelial lesions, stones, or foreign bodies are seen.  He has a mild amount of bladder trabeculations. He has a vascular prostate with large obstructing appearing lateral lobes of his prostate.  He has a long prostate approximately 6 to 7 cm in length.  I passed the quanta laser fiber through the cystoscope and performed a transurethral laser vaporization of his prostate.  He did not have a median lobe.   I vaporized his lateral lobes of his prostate with the quanta laser starting at the  base of his prostate and working my way towards the apex of his prostate.  I stayed proximal to his verumontanum throughout the procedure.  At the end of the procedure, his prostatic channel was wide open and was no longer obstructing appearing.  His bilateral ureteral orifices were orthotopic and patent at the end of the procedure.  I turned down the normal saline flow and there was no bleeding from his prostate or anywhere else.  The cystoscope was then removed from his body.  I then passed a 22 F 3 way marin catheter through the urethra into his bladder.  When yellow colored urine drained from his catheter, 45 cc of sterile water was inflated into his marin catheter balloon.  I manually irrigated his marin catheter with 50 cc of sterile water a few times and his catheter irrigated easily, there were no blood clots, and his urine was clear.  A 4 liter drainage bag was attached to his marin catheter.  Normal saline continuous bladder irrigation was started and his urine is clear.  A post operative timeout was done at the end of the procedure.  The patient was taken to the recovery room in stable condition at the end of the procedure.

## 2024-09-17 NOTE — NURSING TRANSFER
Assumed care of patient from MAYUR Carreno. Patient vitals stable, resting comfortably in bed with no complaints and call light within reach. Plan of care and discharge criteria reviewed with patient/family. Will continue to monitor

## 2024-09-17 NOTE — ANESTHESIA POSTPROCEDURE EVALUATION
Anesthesia Post Evaluation    Patient: Mason Holt    Procedure(s) Performed: Procedure(s) (LRB):  TURP, USING THULIUM LASER (N/A)    Final Anesthesia Type: general      Patient location during evaluation: PACU  Patient participation: Yes- Able to Participate  Level of consciousness: awake and alert and oriented  Post-procedure vital signs: reviewed and stable  Pain management: adequate  Airway patency: patent    PONV status at discharge: No PONV  Anesthetic complications: no      Cardiovascular status: blood pressure returned to baseline, stable and hemodynamically stable  Respiratory status: unassisted  Hydration status: euvolemic  Follow-up not needed.              Vitals Value Taken Time   /75 09/17/24 0918   Temp 36.6 °C (97.8 °F) 09/17/24 0828   Pulse 73 09/17/24 0919   Resp 16 09/17/24 0919   SpO2 95 % 09/17/24 0919   Vitals shown include unfiled device data.      Event Time   Out of Recovery 09:20:00         Pain/Nathan Score: Nathan Score: 10 (9/17/2024  9:30 AM)

## 2024-09-17 NOTE — TRANSFER OF CARE
"Anesthesia Transfer of Care Note    Patient: Mason Holt    Procedure(s) Performed: Procedure(s) (LRB):  TURP, USING THULIUM LASER (N/A)    Patient location: PACU    Anesthesia Type: general    Transport from OR: Transported from OR on room air with adequate spontaneous ventilation    Post pain: adequate analgesia    Post assessment: no apparent anesthetic complications    Post vital signs: stable    Level of consciousness: awake    Nausea/Vomiting: no nausea/vomiting    Complications: none    Transfer of care protocol was followed      Last vitals: Visit Vitals  BP (!) 158/74 (BP Location: Right arm, Patient Position: Sitting)   Pulse 61   Temp 36.3 °C (97.4 °F)   Resp 17   Ht 5' 6" (1.676 m)   Wt 99.2 kg (218 lb 12.9 oz)   SpO2 97%   BMI 35.32 kg/m²     "

## 2024-09-17 NOTE — ANESTHESIA PROCEDURE NOTES
Intubation    Date/Time: 9/17/2024 7:12 AM    Performed by: Jewels Swan CRNA  Authorized by: Pasquale Mcwilliams MD    Intubation:     Induction:  Intravenous    Intubated:  Postinduction    Mask Ventilation:  Easy mask    Attempts:  1    Attempted By:  CRNA    Difficult Airway Encountered?: No      Complications:  None    Airway Device:  Supraglottic airway/LMA    Airway Device Size:  4.0    Secured at:  The lips    Placement Verified By:  Capnometry    Complicating Factors:  None    Findings Post-Intubation:  BS equal bilateral and atraumatic/condition of teeth unchanged

## 2024-09-17 NOTE — DISCHARGE SUMMARY
[facility]  Discharge Note  Short Stay    Procedure(s) (LRB):  TURP, USING THULIUM LASER (N/A)      OUTCOME: Patient tolerated treatment/procedure well without complication and is now ready for discharge.    DISPOSITION: Home or Self Care    FINAL DIAGNOSIS:  <principal problem not specified>    FOLLOWUP: In clinic on 9-.    DISCHARGE INSTRUCTIONS:    Discharge Procedure Orders   Diet Adult Regular     Lifting restrictions   Order Comments: No lifting more than 10 pounds for at least 2 weeks.        TIME SPENT ON DISCHARGE: 30 minutes

## 2024-09-17 NOTE — PLAN OF CARE
Report to MAYUR Mendez, care assumed. Patient moved to [facility] room 4 for observation. Patient's belongings at bedside.

## 2024-09-17 NOTE — NURSING TRANSFER
Patient met criteria for discharge. Discharge instructions given, and medications delievered by pharmacy. Patient dressed and wheeled to car by RN with no complaints. Patient discharged with marin in place. Education provided by Dr. Mills

## 2024-10-07 ENCOUNTER — PATIENT MESSAGE (OUTPATIENT)
Dept: RESEARCH | Facility: HOSPITAL | Age: 84
End: 2024-10-07
Payer: MEDICARE

## 2024-10-07 ENCOUNTER — OFFICE VISIT (OUTPATIENT)
Facility: CLINIC | Age: 84
End: 2024-10-07
Payer: MEDICARE

## 2024-10-07 VITALS
RESPIRATION RATE: 16 BRPM | HEART RATE: 65 BPM | SYSTOLIC BLOOD PRESSURE: 150 MMHG | WEIGHT: 218.69 LBS | BODY MASS INDEX: 35.15 KG/M2 | HEIGHT: 66 IN | DIASTOLIC BLOOD PRESSURE: 72 MMHG

## 2024-10-07 DIAGNOSIS — Z87.442 PERSONAL HISTORY OF URINARY CALCULI: ICD-10-CM

## 2024-10-07 DIAGNOSIS — N30.00 ACUTE CYSTITIS WITHOUT HEMATURIA: ICD-10-CM

## 2024-10-07 DIAGNOSIS — C61 PROSTATE CANCER: Primary | ICD-10-CM

## 2024-10-07 LAB
BILIRUB UR QL STRIP: NEGATIVE
GLUCOSE UR QL STRIP: NEGATIVE
KETONES UR QL STRIP: POSITIVE
LEUKOCYTE ESTERASE UR QL STRIP: POSITIVE
PH, POC UA: 5.5
POC BLOOD, URINE: POSITIVE
POC NITRATES, URINE: NEGATIVE
PROT UR QL STRIP: POSITIVE
SP GR UR STRIP: 1.03 (ref 1–1.03)
UROBILINOGEN UR STRIP-ACNC: 0.2 (ref 0.3–2.2)

## 2024-10-07 PROCEDURE — 3288F FALL RISK ASSESSMENT DOCD: CPT | Mod: CPTII,S$GLB,, | Performed by: UROLOGY

## 2024-10-07 PROCEDURE — 1159F MED LIST DOCD IN RCRD: CPT | Mod: CPTII,S$GLB,, | Performed by: UROLOGY

## 2024-10-07 PROCEDURE — 99213 OFFICE O/P EST LOW 20 MIN: CPT | Mod: 24,S$GLB,, | Performed by: UROLOGY

## 2024-10-07 PROCEDURE — 87086 URINE CULTURE/COLONY COUNT: CPT | Performed by: UROLOGY

## 2024-10-07 PROCEDURE — 1101F PT FALLS ASSESS-DOCD LE1/YR: CPT | Mod: CPTII,S$GLB,, | Performed by: UROLOGY

## 2024-10-07 PROCEDURE — 1126F AMNT PAIN NOTED NONE PRSNT: CPT | Mod: CPTII,S$GLB,, | Performed by: UROLOGY

## 2024-10-07 PROCEDURE — 3077F SYST BP >= 140 MM HG: CPT | Mod: CPTII,S$GLB,, | Performed by: UROLOGY

## 2024-10-07 PROCEDURE — 99999 PR PBB SHADOW E&M-EST. PATIENT-LVL III: CPT | Mod: PBBFAC,,, | Performed by: UROLOGY

## 2024-10-07 PROCEDURE — 3078F DIAST BP <80 MM HG: CPT | Mod: CPTII,S$GLB,, | Performed by: UROLOGY

## 2024-10-07 PROCEDURE — 81003 URINALYSIS AUTO W/O SCOPE: CPT | Mod: QW,S$GLB,, | Performed by: UROLOGY

## 2024-10-07 RX ORDER — CIPROFLOXACIN 500 MG/1
500 TABLET ORAL 2 TIMES DAILY
Qty: 28 TABLET | Refills: 0 | Status: SHIPPED | OUTPATIENT
Start: 2024-10-07

## 2024-10-07 NOTE — PROGRESS NOTES
Subjective:       Patient ID: Mason Holt is a 84 y.o. male.    Chief Complaint: Post-op Evaluation (/)      History of Present Illness:     Mr Holt has prostate cancer and he has been on active surveillance since 2010.  Prostate biopsy by an outside Urologist in 2010 showed a single core of jhon 3+2=5.  His most recent PSA was 3.67 on 1-3-24.    Mild intermittent dysuria.  Good urinary flow.  He feels that he is emptying his bladder.  No significant gross hematuria.  He has BPH with LUTS and with intermittent gross hematuria s/p transurethral laser vaporization of his prostate by me on 9-17-24.  His marin catheter was removed about 1 week after this procedure. He has a history of a TURP in 2017 by an outside Urologist, Dr Gurjit Reyes in [place], [place].  30 grams of prostate tissue in the specimen and no prostate cancer was seen.    He has a history of a CT urogram on 7-25-23 that showed a 5 mm non obstructing stone within the right ureterovesical junction.  A couple of tiny punctate calyceal stones are visible in both kidneys.  S/p right USE with laser lithotripsy, cystoscopy, right ureteral stent placement by me on 8-2-23.  His right ureteral stent was removed cystoscopically by me on 8-9-23.         Past Medical History:   Diagnosis Date    BPH (benign prostatic hyperplasia)      Family History   Problem Relation Name Age of Onset    No Known Problems Father      No Known Problems Mother      No Known Problems Maternal Aunt      No Known Problems Maternal Uncle      No Known Problems Paternal Aunt      No Known Problems Paternal Uncle      No Known Problems Paternal Grandmother      No Known Problems Paternal Grandfather      No Known Problems Maternal Grandmother      No Known Problems Maternal Grandfather       Social History     Socioeconomic History    Marital status:    Tobacco Use    Smoking status: Never    Smokeless tobacco: Never   Substance and Sexual Activity    Alcohol use: Never    Drug  use: Never    Sexual activity: Yes     Partners: Female     Social Drivers of Health     Financial Resource Strain: Low Risk  (2023)    [facility]    Overall Financial Resource Strain (CARDIA)     Difficulty of Paying Living Expenses: Not hard at all    Received from [facility]    Food Insecurity   Transportation Needs: No Transportation Needs (2023)    [facility]    PRAPARE - Transportation     Lack of Transportation (Medical): No     Lack of Transportation (Non-Medical): No   Physical Activity: Unknown (2023)    Received [facility]    Exercise Vital Sign     Days of Exercise per Week: 0 days   Stress: No Stress Concern Present (2023)    Received from [facility]    [employer] - Occupational Stress Questionnaire     Feeling of Stress : Not at all    Received from [facility]    Housing Stability     Outpatient Encounter Medications as of 10/7/2024   Medication Sig Dispense Refill    finasteride (PROSCAR) 5 mg tablet Take 1 tablet (5 mg total) by mouth once daily. 90 tablet 3    ciprofloxacin HCl (CIPRO) 500 MG tablet Take 1 tablet (500 mg total) by mouth 2 (two) times daily. 28 tablet 0    finasteride (PROSCAR) 5 mg tablet Take 1 tablet (5 mg total) by mouth once daily. (Patient not taking: Reported on 10/7/2024) 90 tablet 3    [] phenazopyridine (PYRIDIUM) 100 MG tablet Take 1 tablet (100 mg total) by mouth 3 (three) times daily as needed for Pain. 30 tablet 1    [DISCONTINUED] acetaminophen tablet 650 mg       [DISCONTINUED] acetaminophen tablet 650 mg       [DISCONTINUED] ceFAZolin 2 g in D5W 50 mL  IVPB (MB+)       [DISCONTINUED] ciprofloxacin (CIPRO)400mg/200ml D5W IVPB 400 mg       [DISCONTINUED] dexAMETHasone injection       [DISCONTINUED] dexmedeTOMIDine injection       [DISCONTINUED] dextrose 10% bolus 125 mL 125 mL       [DISCONTINUED] dextrose 10% bolus 250 mL 250 mL       [DISCONTINUED] ePHEDrine sulfate       [DISCONTINUED] fentaNYL 50 mcg/mL injection 25 mcg       [DISCONTINUED] fentaNYL injection       [DISCONTINUED] glucagon (human recombinant) injection 1 mg       [DISCONTINUED] HYDROcodone-acetaminophen  mg per tablet 1 tablet       [DISCONTINUED] HYDROcodone-acetaminophen 5-325 mg per tablet 1 tablet       [DISCONTINUED] HYDROmorphone (PF) injection 1 mg       [DISCONTINUED] iohexoL (OMNIPAQUE 300) 300 mg iodine/mL injection       [DISCONTINUED] lactated ringers infusion       [DISCONTINUED] LIDOcaine (cardiac) injection       [DISCONTINUED] LIDOcaine (PF) 10 mg/ml (1%) injection 10 mg       [DISCONTINUED] LIDOcaine HCl 2% (XYLOCAINE) 2 % urojet       [DISCONTINUED] LIDOcaine HCl 2% urojet       [DISCONTINUED] melatonin tablet 6 mg       [DISCONTINUED] meperidine (PF) injection 12.5 mg       [DISCONTINUED] ondansetron disintegrating tablet 4 mg       [DISCONTINUED] ondansetron injection 4 mg       [DISCONTINUED] ondansetron injection       [DISCONTINUED] oxyCODONE-acetaminophen 5-325 mg per tablet 1 tablet       [DISCONTINUED] propofol (DIPRIVAN) 10 mg/mL infusion       [DISCONTINUED] sodium chloride 0.9% flush 10 mL        No facility-administered encounter medications on file as of 10/7/2024.        Review of Systems   Constitutional:  Negative for chills and fever.   Respiratory:  Negative for shortness of breath.    Cardiovascular:  Negative for chest pain.   Gastrointestinal:  Negative for nausea and vomiting.   Genitourinary:  Negative for difficulty urinating and dysuria.   Musculoskeletal:  Negative for back pain.   Skin:  Negative for rash.   Neurological:  Negative for dizziness.  "  Psychiatric/Behavioral:  Negative for agitation.        Objective:     BP (!) 150/72 (BP Location: Right arm, Patient Position: Sitting)   Pulse 65   Resp 16   Ht 5' 6" (1.676 m)   Wt 99.2 kg (218 lb 11.1 oz)   BMI 35.30 kg/m²     Physical Exam  Constitutional:       Appearance: Normal appearance.   Pulmonary:      Effort: Pulmonary effort is normal.   Abdominal:      Palpations: Abdomen is soft.   Neurological:      Mental Status: He is alert and oriented to person, place, and time.   Psychiatric:         Mood and Affect: Mood normal.         Office Visit on 10/07/2024   Component Date Value Ref Range Status    POC Blood, Urine 10/07/2024 Positive (A)  Negative Final    POC Bilirubin, Urine 10/07/2024 Negative  Negative Final    POC Urobilinogen, Urine 10/07/2024 0.2 (A)  0.3 - 2.2 Final    POC Ketones, Urine 10/07/2024 Positive (A)  Negative Final    POC Protein, Urine 10/07/2024 Positive (A)  Negative Final    POC Nitrates, Urine 10/07/2024 Negative  Negative Final    POC Glucose, Urine 10/07/2024 Negative  Negative Final    pH, UA 10/07/2024 5.5   Final    POC Specific Gravity, Urine 10/07/2024 1.030 (A)  1.003 - 1.029 Final    POC Leukocytes, Urine 10/07/2024 Positive (A)  Negative Final        Results for orders placed or performed in visit on 08/15/24 (from the past 8760 hours)   POCT Bladder Scan   Result Value    POC Residual Urine Volume 95        Assessment:       1. Prostate cancer    2. Acute cystitis without hematuria    3. Personal history of urinary calculi      Plan:     Orders Placed This Encounter    CULTURE, URINE    PROSTATE SPECIFIC ANTIGEN, DIAGNOSTIC    POCT Urinalysis, Dipstick, Automated, W/O Scope    ciprofloxacin HCl (CIPRO) 500 MG tablet             6-10-24  MRI abdomen with and without IV contrast.  BRG.  See report.  Bilateral benign simple and proteinaceous renal cysts measuring up to 2.4 cm at the upper pole of the left kidney. The largest proteinaceous cysts are at the upper " pole of the right kidney measuring up to 1.2 cm. No hydronephrosis.  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 Recommend one-year surveillance MRI.      10-31-23  CT abdomen/pelvis with IV contrast.  OLOL.  See report.  No suspicious renal lesion is noted. There are postop changes of TURP.  No pelvic mass is detected.      7-25-23  CT urogram.  MARIA INES.  See report.  5 mm non obstructing stone within the right ureterovesical junction.  A couple of tiny punctate calyceal stones are visible in both kidneys.  Multiple cysts of varying size and proteinaceous content on both kidneys.  No definitive solid renal mass. Markedly enlarged prostate gland.     I reviewed the above imaging results.            1-3-24  PSA 3.67     12-13-23  PSA 3.6     7-19-23  PSA 3.1     8-30-24  Cr 0.9.  GFR >60.     1-25-24  Cr 0.72     1-3-24  HgbA1c 5.9     I reviewed the above lab results.            Assessment:  - Cystitis.  - Prostate cancer.  He has been on active surveillance since 2010.  Prostate biopsy by an outside Urologist in 2010 showed a single core of jhon 3+2=5.  - BPH with LUTS and with intermittent gross hematuria s/p transurethral laser vaporization of his prostate by me on 9-17-24.  History of a TURP in 2017 by an outside Urologist, Dr Gurjit Reyes in [place], [place].  30 grams of prostate tissue in the specimen and no prostate cancer was seen.  - Intermittent gross hematuria that started in mid August 2024.  Persistent microscopic hematuria.  Cystoscopy on 8-26-24 showed an obstructing and vascular appearing trilobar hypertrophy prostate.  He is not a smoker and he has no smoking history.  He is not taking a blood thinner.  - History of a CT urogram on 7-25-23 that showed a 5 mm non obstructing stone within the right ureterovesical junction.  A couple of tiny punctate calyceal stones are visible in both kidneys.  S/p right USE with laser lithotripsy, cystoscopy, right ureteral stent  placement by me on 8-2-23.  His right ureteral stent was removed cystoscopically by me on 8-9-23.  - History of kidney stones.     Plan:  - The mutual decision was made to continue with active surveillance of his prostate cancer with a repeat PSA in 3 months.  - Urine culture today.  - Started ciprofloxacin 500 mg 1 PO BID X 2 weeks.  - Continue finasteride 5 mg 1 PO qdaily that was started on 9-9-24.  - I discussed dietary modifications with him today and I recommended he drink mostly water during the day.   - PSA in 3 months a few days prior to a 3 month office visit with a PVR on arrival.

## 2024-10-08 LAB
BACTERIA UR CULT: NORMAL
BACTERIA UR CULT: NORMAL

## 2024-10-15 ENCOUNTER — PATIENT MESSAGE (OUTPATIENT)
Dept: RESEARCH | Facility: HOSPITAL | Age: 84
End: 2024-10-15
Payer: MEDICARE

## 2024-10-30 ENCOUNTER — PATIENT MESSAGE (OUTPATIENT)
Dept: SURGERY | Facility: HOSPITAL | Age: 84
End: 2024-10-30
Payer: MEDICARE

## 2024-11-13 ENCOUNTER — PATIENT MESSAGE (OUTPATIENT)
Dept: SURGERY | Facility: HOSPITAL | Age: 84
End: 2024-11-13
Payer: MEDICARE

## 2024-12-30 ENCOUNTER — LAB VISIT (OUTPATIENT)
Dept: LAB | Facility: HOSPITAL | Age: 84
End: 2024-12-30
Attending: UROLOGY
Payer: MEDICARE

## 2024-12-30 DIAGNOSIS — C61 PROSTATE CANCER: ICD-10-CM

## 2024-12-30 LAB — COMPLEXED PSA SERPL-MCNC: 2.7 NG/ML (ref 0–4)

## 2024-12-30 PROCEDURE — 84153 ASSAY OF PSA TOTAL: CPT | Performed by: UROLOGY

## 2024-12-30 PROCEDURE — 36415 COLL VENOUS BLD VENIPUNCTURE: CPT | Mod: PO | Performed by: UROLOGY

## 2025-01-06 ENCOUNTER — OFFICE VISIT (OUTPATIENT)
Facility: CLINIC | Age: 85
End: 2025-01-06
Payer: MEDICARE

## 2025-01-06 VITALS
WEIGHT: 218.69 LBS | HEART RATE: 55 BPM | BODY MASS INDEX: 35.3 KG/M2 | SYSTOLIC BLOOD PRESSURE: 172 MMHG | DIASTOLIC BLOOD PRESSURE: 80 MMHG | RESPIRATION RATE: 16 BRPM

## 2025-01-06 DIAGNOSIS — C61 PROSTATE CANCER: ICD-10-CM

## 2025-01-06 DIAGNOSIS — R31.29 OTHER MICROSCOPIC HEMATURIA: ICD-10-CM

## 2025-01-06 DIAGNOSIS — N32.81 OVERACTIVE BLADDER: ICD-10-CM

## 2025-01-06 DIAGNOSIS — N30.00 ACUTE CYSTITIS WITHOUT HEMATURIA: Primary | ICD-10-CM

## 2025-01-06 DIAGNOSIS — R35.1 NOCTURIA: ICD-10-CM

## 2025-01-06 LAB
BILIRUB UR QL STRIP: NEGATIVE
GLUCOSE UR QL STRIP: NEGATIVE
KETONES UR QL STRIP: NEGATIVE
LEUKOCYTE ESTERASE UR QL STRIP: POSITIVE
PH, POC UA: 6
POC BLOOD, URINE: POSITIVE
POC NITRATES, URINE: NEGATIVE
PROT UR QL STRIP: POSITIVE
SP GR UR STRIP: 1.02 (ref 1–1.03)
UROBILINOGEN UR STRIP-ACNC: 0.2 (ref 0.3–2.2)

## 2025-01-06 PROCEDURE — 3288F FALL RISK ASSESSMENT DOCD: CPT | Mod: CPTII,S$GLB,, | Performed by: UROLOGY

## 2025-01-06 PROCEDURE — 99214 OFFICE O/P EST MOD 30 MIN: CPT | Mod: S$GLB,,, | Performed by: UROLOGY

## 2025-01-06 PROCEDURE — 81003 URINALYSIS AUTO W/O SCOPE: CPT | Mod: QW,S$GLB,, | Performed by: UROLOGY

## 2025-01-06 PROCEDURE — 3077F SYST BP >= 140 MM HG: CPT | Mod: CPTII,S$GLB,, | Performed by: UROLOGY

## 2025-01-06 PROCEDURE — 3079F DIAST BP 80-89 MM HG: CPT | Mod: CPTII,S$GLB,, | Performed by: UROLOGY

## 2025-01-06 PROCEDURE — 1159F MED LIST DOCD IN RCRD: CPT | Mod: CPTII,S$GLB,, | Performed by: UROLOGY

## 2025-01-06 PROCEDURE — 1101F PT FALLS ASSESS-DOCD LE1/YR: CPT | Mod: CPTII,S$GLB,, | Performed by: UROLOGY

## 2025-01-06 PROCEDURE — 99999 PR PBB SHADOW E&M-EST. PATIENT-LVL III: CPT | Mod: PBBFAC,,, | Performed by: UROLOGY

## 2025-01-06 PROCEDURE — 87086 URINE CULTURE/COLONY COUNT: CPT | Performed by: UROLOGY

## 2025-01-06 PROCEDURE — 1126F AMNT PAIN NOTED NONE PRSNT: CPT | Mod: CPTII,S$GLB,, | Performed by: UROLOGY

## 2025-01-06 RX ORDER — CEFDINIR 300 MG/1
300 CAPSULE ORAL 2 TIMES DAILY
Qty: 28 CAPSULE | Refills: 0 | Status: SHIPPED | OUTPATIENT
Start: 2025-01-06 | End: 2025-01-16

## 2025-01-06 NOTE — PROGRESS NOTES
Subjective:       Patient ID: Mason Holt is a 84 y.o. male.    Chief Complaint: No chief complaint on file.      History of Present Illness:     Mr Holt is having nocturia several times at night.  He has daytime urinary urgency and frequency.  No significant UI.  No gross hematuria.  No dysuria.  Normal urinary flow.  He has an enlarged prostate and is s/p transurethral laser vaporization of his prostate by me on 9-17-24.  History of a TURP in 2017 by an outside Urologist, Dr Gurjit Reyes in [place], [place].  30 grams of prostate tissue in the specimen and no prostate cancer was seen.    He has prostate cancer.  He has been on active surveillance since 2010.  Prostate biopsy by an outside Urologist in 2010 showed a single core of jhon 3+2=5.         Past Medical History:   Diagnosis Date    BPH (benign prostatic hyperplasia)      Family History   Problem Relation Name Age of Onset    No Known Problems Father      No Known Problems Mother      No Known Problems Maternal Aunt      No Known Problems Maternal Uncle      No Known Problems Paternal Aunt      No Known Problems Paternal Uncle      No Known Problems Paternal Grandmother      No Known Problems Paternal Grandfather      No Known Problems Maternal Grandmother      No Known Problems Maternal Grandfather       Social History     Socioeconomic History    Marital status:    Tobacco Use    Smoking status: Never    Smokeless tobacco: Never   Substance and Sexual Activity    Alcohol use: Never    Drug use: Never    Sexual activity: Yes     Partners: Female     Social Drivers of Health     Financial Resource Strain: Low Risk  (1/4/2025)    Overall Financial Resource Strain (CARDIA)     Difficulty of Paying Living Expenses: Not very hard   Food Insecurity: No Food Insecurity (1/4/2025)    Hunger Vital Sign     Worried About Running Out of Food in the Last Year: Never true     Ran Out of Food in the Last Year: Never true   Transportation Needs: No  Transportation Needs (8/1/2023)    [facility] from [facility]    PRAPARE - Transportation     Lack of Transportation (Medical): No     Lack of Transportation (Non-Medical): No   Physical Activity: Sufficiently Active (1/4/2025)    Exercise Vital Sign     Days of Exercise per Week: 6 days     Minutes of Exercise per Session: 30 min   Stress: No Stress Concern Present (1/4/2025)    [employer] - Occupational Stress Questionnaire     Feeling of Stress : Not at all   Housing Stability: Unknown (1/4/2025)    Housing Stability Vital Sign     Unable to Pay for Housing in the Last Year: No     Outpatient Encounter Medications as of 1/6/2025   Medication Sig Dispense Refill    finasteride (PROSCAR) 5 mg tablet Take 1 tablet (5 mg total) by mouth once daily. 90 tablet 3    cefdinir (OMNICEF) 300 MG capsule Take 1 capsule (300 mg total) by mouth 2 (two) times daily. for 10 days 28 capsule 0    ciprofloxacin HCl (CIPRO) 500 MG tablet Take 1 tablet (500 mg total) by mouth 2 (two) times daily. (Patient not taking: Reported on 1/6/2025) 28 tablet 0    finasteride (PROSCAR) 5 mg tablet Take 1 tablet (5 mg total) by mouth once daily. (Patient not taking: Reported on 9/9/2024) 90 tablet 3     No facility-administered encounter medications on file as of 1/6/2025.        Review of Systems   Constitutional:  Negative for chills and fever.   Respiratory:  Negative for shortness of breath.    Cardiovascular:  Negative for chest pain.   Gastrointestinal:  Negative for nausea and vomiting.   Genitourinary:  Positive for frequency and urgency. Negative for difficulty urinating, dysuria and hematuria.   Musculoskeletal:  Negative for back pain.   Skin:  Negative for rash.   Neurological:  Negative for dizziness.   Psychiatric/Behavioral:  Negative for agitation.        Objective:     BP (!) 172/80 (Patient Position: Sitting)   Pulse (!) 55    Resp 16   Wt 99.2 kg (218 lb 11.1 oz)   BMI 35.30 kg/m²     Physical Exam  Constitutional:       Appearance: Normal appearance.   Pulmonary:      Effort: Pulmonary effort is normal.   Abdominal:      Palpations: Abdomen is soft.   Neurological:      Mental Status: He is alert and oriented to person, place, and time.   Psychiatric:         Mood and Affect: Mood normal.         Office Visit on 01/06/2025   Component Date Value Ref Range Status    POC Blood, Urine 01/06/2025 Positive (A)  Negative Final    POC Bilirubin, Urine 01/06/2025 Negative  Negative Final    POC Urobilinogen, Urine 01/06/2025 0.2 (A)  0.3 - 2.2 Final    POC Ketones, Urine 01/06/2025 Negative  Negative Final    POC Protein, Urine 01/06/2025 Positive (A)  Negative Final    POC Nitrates, Urine 01/06/2025 Negative  Negative Final    POC Glucose, Urine 01/06/2025 Negative  Negative Final    pH, UA 01/06/2025 6.0   Final    POC Specific Gravity, Urine 01/06/2025 1.020  1.003 - 1.029 Final    POC Leukocytes, Urine 01/06/2025 Positive (A)  Negative Final        Results for orders placed or performed in visit on 08/15/24 (from the past 8760 hours)   POCT Bladder Scan   Result Value    POC Residual Urine Volume 95        Assessment:       1. Acute cystitis without hematuria    2. Nocturia    3. Overactive bladder    4. Prostate cancer    5. Other microscopic hematuria        Plan:     Orders Placed This Encounter    Urine Culture High Risk    POCT Urinalysis, Dipstick, Automated, W/O Scope    cefdinir (OMNICEF) 300 MG capsule             6-10-24  MRI abdomen with and without IV contrast.  BRG.  See report.  Bilateral benign simple and proteinaceous renal cysts measuring up to 2.4 cm at the upper pole of the left kidney. The largest proteinaceous cysts are at the upper pole of the right kidney measuring up to 1.2 cm. No hydronephrosis.  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 Recommend one-year surveillance  MRI.      10-31-23  CT abdomen/pelvis with IV contrast.  OLOL.  See report.  No suspicious renal lesion is noted. There are postop changes of TURP.  No pelvic mass is detected.      7-25-23  CT urogram.  MARIA INES.  See report.  5 mm non obstructing stone within the right ureterovesical junction.  A couple of tiny punctate calyceal stones are visible in both kidneys.  Multiple cysts of varying size and proteinaceous content on both kidneys.  No definitive solid renal mass. Markedly enlarged prostate gland.     I reviewed the above imaging results.           12-30-24  PSA 2.7     1-3-24  PSA 3.67     12-13-23  PSA 3.6     7-19-23  PSA 3.1     8-30-24  Cr 0.9.  GFR >60.     1-25-24  Cr 0.72     1-3-24  HgbA1c 5.9    12-18-24  Urine culture.  No growth.    10-7-24  Urine culture.  Multiple organisms isolated. None in predominance.      I reviewed the above lab results.            Assessment:  - Cystitis.  - Nocturia and OAB.  - Prostate cancer.  He has been on active surveillance since 2010.  Prostate biopsy by an outside Urologist in 2010 showed a single core of jhon 3+2=5.  - Enlarged with irritative LUTS.  S/p transurethral laser vaporization of his prostate by me on 9-17-24.  History of a TURP in 2017 by an outside Urologist, Dr Gurjit Reyes in [place], [place].  30 grams of prostate tissue in the specimen and no prostate cancer was seen.  - History of intermittent gross hematuria that started in mid August 2024 has resolved at this time.  Persistent microscopic hematuria.  Cystoscopy on 8-26-24 showed an obstructing and vascular appearing trilobar hypertrophy prostate.  He is not a smoker and he has no smoking history.  He is not taking a blood thinner.  - History of a CT urogram on 7-25-23 that showed a 5 mm non obstructing stone within the right ureterovesical junction.  A couple of tiny punctate calyceal stones are visible in both kidneys.  S/p right USE with laser lithotripsy, cystoscopy, right ureteral stent placement  by me on 8-2-23.  His right ureteral stent was removed cystoscopically by me on 8-9-23.  - History of kidney stones.     Plan:  - Continue with active surveillance of his prostate cancer with a repeat PSA in 3 months.  - Urine culture today.  - Started cefdinir 300 mg 1 PO BID X 2 weeks.  - Continue finasteride 5 mg 1 PO qdaily that was started on 9-9-24.  - I discussed dietary modifications with him today and I recommended he drink mostly water during the day.  - I recommended he limit his fluid intake at night to small amounts of water and to void just prior to bedtime.   - RTC in 4 weeks with a PVR on arrival.

## 2025-01-07 LAB
BACTERIA UR CULT: NORMAL
BACTERIA UR CULT: NORMAL

## 2025-01-08 ENCOUNTER — TELEPHONE (OUTPATIENT)
Dept: UROLOGY | Facility: CLINIC | Age: 85
End: 2025-01-08
Payer: MEDICARE

## 2025-01-08 NOTE — TELEPHONE ENCOUNTER
..Patient was contacted and informed of positive UTI; patient was told that MD ordered antibiotics and they were sent to the pharmacy; further instructed to take medication in it's entirety and to let us know if symptoms of UTI remained upon completion of antibiotics.  Patient verbalized understanding.           ----- Message from Dave Mills MD sent at 1/8/2025  7:45 AM CST -----  Please call Mr Holt and let him know that I reviewed his urine culture result and it grew multiple organisms which usually means that the urine same was not a good mid stream catch.  I recommend he only take 1 week of the 2 week course of cefdinir.  I recommend he drink mostly water.  Make sure he knows the details of his follow up appointment.  Ask him if he has any questions.

## 2025-03-11 ENCOUNTER — TELEPHONE (OUTPATIENT)
Dept: UROLOGY | Facility: CLINIC | Age: 85
End: 2025-03-11
Payer: MEDICARE

## 2025-03-21 ENCOUNTER — OFFICE VISIT (OUTPATIENT)
Facility: CLINIC | Age: 85
End: 2025-03-21
Payer: MEDICARE

## 2025-03-21 VITALS
HEART RATE: 63 BPM | RESPIRATION RATE: 16 BRPM | HEIGHT: 66 IN | SYSTOLIC BLOOD PRESSURE: 157 MMHG | DIASTOLIC BLOOD PRESSURE: 69 MMHG | BODY MASS INDEX: 35.3 KG/M2

## 2025-03-21 DIAGNOSIS — R35.1 NOCTURIA: Primary | ICD-10-CM

## 2025-03-21 DIAGNOSIS — N40.0 BPH WITHOUT URINARY OBSTRUCTION: ICD-10-CM

## 2025-03-21 DIAGNOSIS — K86.9 PANCREATIC LESION: ICD-10-CM

## 2025-03-21 DIAGNOSIS — C61 PROSTATE CANCER: ICD-10-CM

## 2025-03-21 DIAGNOSIS — R31.29 OTHER MICROSCOPIC HEMATURIA: ICD-10-CM

## 2025-03-21 DIAGNOSIS — N20.0 KIDNEY STONE: ICD-10-CM

## 2025-03-21 DIAGNOSIS — N28.1 COMPLEX RENAL CYST: ICD-10-CM

## 2025-03-21 PROCEDURE — 99999 PR PBB SHADOW E&M-EST. PATIENT-LVL III: CPT | Mod: PBBFAC,,, | Performed by: UROLOGY

## 2025-03-21 NOTE — PROGRESS NOTES
Subjective:       Patient ID: Mason Holt is a 84 y.o. male.    Chief Complaint: Follow-up      History of Present Illness:     Mr Holt has nocturia X 2-3.  He has BPH without significant LUTS at this time.  Normal urinary flow.  He says he is still taking finasteride 5 mg daily.  His PVR today on 3-21-25 is 144 ml.  He underwent a transurethral laser vaporization of his prostate by me on 9-17-24.  He has a history of a TURP in 2017 by an outside Urologist, Dr Gurjit Reyes in [place], [place].  30 grams of prostate tissue in the specimen and no prostate cancer was seen.    He has persistent microscopic hematuria.  Trace amount of MH today on 3-21-25.  History of intermittent gross hematuria that started in mid August 2024 has resolved at this time.  No current gross hematuria.  No dysuria.  He underwent a cystoscopy on 8-26-24 that showed an obstructing and vascular appearing trilobar hypertrophy prostate.  He is not a smoker and he has no smoking history.  He is not taking a blood thinner.    He underwent a MRI abdomen with and without IV contrast on 6-10-24 showed bilateral benign simple and proteinaceous renal cysts measuring up to 2.4 cm at the upper pole of the left kidney. The largest proteinaceous cysts are at the upper pole of the right kidney measuring up to 1.2 cm. No hydronephrosis.  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 Recommend one-year surveillance MRI.   He has prostate cancer and he has been on active surveillance since 2010.  Prostate biopsy by an outside Urologist in 2010 showed a single core of jhon 3+2=5.         Past Medical History:   Diagnosis Date    BPH (benign prostatic hyperplasia)      Family History   Problem Relation Name Age of Onset    No Known Problems Father      No Known Problems Mother      No Known Problems Maternal Aunt      No Known Problems Maternal Uncle      No Known Problems Paternal Aunt      No Known Problems Paternal Uncle   "    No Known Problems Paternal Grandmother      No Known Problems Paternal Grandfather      No Known Problems Maternal Grandmother      No Known Problems Maternal Grandfather       Social History[1]  Encounter Medications[2]     Review of Systems   Constitutional:  Negative for chills and fever.   Respiratory:  Negative for shortness of breath.    Cardiovascular:  Negative for chest pain.   Gastrointestinal:  Negative for nausea and vomiting.   Genitourinary:  Negative for difficulty urinating, dysuria and hematuria.   Musculoskeletal:  Negative for back pain.   Skin:  Negative for rash.   Neurological:  Negative for dizziness.   Psychiatric/Behavioral:  Negative for agitation.        Objective:     BP (!) 157/69 (BP Location: Left arm, Patient Position: Sitting)   Pulse 63   Resp 16   Ht 5' 6" (1.676 m)   BMI 35.30 kg/m²     Physical Exam  Constitutional:       Appearance: Normal appearance.   Pulmonary:      Effort: Pulmonary effort is normal.   Abdominal:      Palpations: Abdomen is soft.   Neurological:      Mental Status: He is alert and oriented to person, place, and time.   Psychiatric:         Mood and Affect: Mood normal.         No visits with results within 1 Day(s) from this visit.   Latest known visit with results is:   Office Visit on 01/06/2025   Component Date Value Ref Range Status    POC Blood, Urine 01/06/2025 Positive (A)  Negative Final    POC Bilirubin, Urine 01/06/2025 Negative  Negative Final    POC Urobilinogen, Urine 01/06/2025 0.2 (A)  0.3 - 2.2 Final    POC Ketones, Urine 01/06/2025 Negative  Negative Final    POC Protein, Urine 01/06/2025 Positive (A)  Negative Final    POC Nitrates, Urine 01/06/2025 Negative  Negative Final    POC Glucose, Urine 01/06/2025 Negative  Negative Final    pH, UA 01/06/2025 6.0   Final    POC Specific Gravity, Urine 01/06/2025 1.020  1.003 - 1.029 Final    POC Leukocytes, Urine 01/06/2025 Positive (A)  Negative Final    Urine Culture, Routine 01/06/2025 " Multiple organisms isolated. None in predominance.  Repeat if   Final    Urine Culture, Routine 01/06/2025 clinically necessary.   Final        Results for orders placed or performed in visit on 08/15/24 (from the past 8760 hours)   POCT Bladder Scan   Result Value    POC Residual Urine Volume 95        Assessment:       1. Nocturia    2. Other microscopic hematuria    3. Complex renal cyst    4. BPH without urinary obstruction    5. Prostate cancer    6. Kidney stone    7. Pancreatic lesion        Plan:     Orders Placed This Encounter    MRI Abdomen W WO Contrast    PROSTATE SPECIFIC ANTIGEN, DIAGNOSTIC    CREATININE, SERUM    POCT Urinalysis, Dipstick, Automated, W/O Scope    POCT Bladder Scan          6-10-24  MRI abdomen with and without IV contrast.  BRG.  See report.  Bilateral benign simple and proteinaceous renal cysts measuring up to 2.4 cm at the upper pole of the left kidney. The largest proteinaceous cysts are at the upper pole of the right kidney measuring up to 1.2 cm. No hydronephrosis.  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 Recommend one-year surveillance MRI.      10-31-23  CT abdomen/pelvis with IV contrast.  OLOL.  See report.  No suspicious renal lesion is noted. There are postop changes of TURP.  No pelvic mass is detected.      7-25-23  CT urogram.  MARIA INES.  See report.  5 mm non obstructing stone within the right ureterovesical junction.  A couple of tiny punctate calyceal stones are visible in both kidneys.  Multiple cysts of varying size and proteinaceous content on both kidneys.  No definitive solid renal mass. Markedly enlarged prostate gland.     I reviewed the above imaging results.            12-30-24  PSA 2.7     1-3-24  PSA 3.67     12-13-23  PSA 3.6     7-19-23  PSA 3.1     8-30-24  Cr 0.9.  GFR >60.     1-25-24  Cr 0.72     1-3-24  HgbA1c 5.9    1-6-25  Urine culture.  Multiple organisms isolated. None in predominance.      12-18-24  Urine  culture.  No growth.     10-7-24  Urine culture.  Multiple organisms isolated. None in predominance.      I reviewed the above lab results.            Assessment:  - Nocturia.  - BPH without significant LUTS at this time.  PVR today on 3-21-25 is 144 ml.  History of a transurethral laser vaporization of his prostate by me on 9-17-24.  History of a TURP in 2017 by an outside Urologist, Dr Gurjit Reyes in [place], [place].  30 grams of prostate tissue in the specimen and no prostate cancer was seen.  - Persistent microscopic hematuria.  Trace amount of MH today on 3-21-25.  History of intermittent gross hematuria that started in mid August 2024 has resolved at this time. Cystoscopy on 8-26-24 showed an obstructing and vascular appearing trilobar hypertrophy prostate.  He is not a smoker and he has no smoking history.  He is not taking a blood thinner.  - MRI abdomen with and without IV contrast on 6-10-24 showed bilateral benign simple and proteinaceous renal cysts measuring up to 2.4 cm at the upper pole of the left kidney. The largest proteinaceous cysts are at the upper pole of the right kidney measuring up to 1.2 cm. No hydronephrosis.  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 Recommend one-year surveillance MRI.   - Prostate cancer.  He has been on active surveillance since 2010.  Prostate biopsy by an outside Urologist in 2010 showed a single core of jhon 3+2=5.  - History of a CT urogram on 7-25-23 that showed a 5 mm non obstructing stone within the right ureterovesical junction.  A couple of tiny punctate calyceal stones are visible in both kidneys.  S/p right USE with laser lithotripsy, cystoscopy, right ureteral stent placement by me on 8-2-23.  His right ureteral stent was removed cystoscopically by me on 8-9-23.  - History of kidney stones.     Plan:  - Continue with active surveillance of his prostate cancer with a repeat PSA in 3 months.  - Continue finasteride 5  mg 1 PO qdaily that was started on 9-9-24.  - I discussed dietary modifications with him today and I recommended he drink mostly water during the day.  - I recommended he limit his fluid intake at night to small amounts of water and to void just prior to bedtime.   - PSA and creatinine both in 3 months (about 1 week prior to his MRI abdomen).  - MRI abdomen with and without IV in 3 months a few days prior to a 3 month appointment.  - RTC in 3 months with a PVR on arrival.                         [1]   Social History  Socioeconomic History    Marital status:    Tobacco Use    Smoking status: Never    Smokeless tobacco: Never   Substance and Sexual Activity    Alcohol use: Never    Drug use: Never    Sexual activity: Yes     Partners: Female     Social Drivers of Health     Financial Resource Strain: Low Risk  (1/4/2025)    Overall Financial Resource Strain (CARDIA)     Difficulty of Paying Living Expenses: Not very hard   Food Insecurity: No Food Insecurity (1/4/2025)    Hunger Vital Sign     Worried About Running Out of Food in the Last Year: Never true     Ran Out of Food in the Last Year: Never true   Transportation Needs: No Transportation Needs (8/1/2023)    Received from [facility]    PRAPARE - Transportation     Lack of Transportation (Medical): No     Lack of Transportation (Non-Medical): No   Physical Activity: Sufficiently Active (1/4/2025)    Exercise Vital Sign     Days of Exercise per Week: 6 days     Minutes of Exercise per Session: 30 min   Stress: No Stress Concern Present (1/4/2025)    [employer] - Occupational Stress Questionnaire     Feeling of Stress : Not at all   Housing Stability: Unknown (1/4/2025)    Housing Stability Vital Sign     Unable to Pay for Housing in the Last Year: No   [2]   Outpatient Encounter Medications as of 3/21/2025   Medication Sig Dispense Refill     ciprofloxacin HCl (CIPRO) 500 MG tablet Take 1 tablet (500 mg total) by mouth 2 (two) times daily. 28 tablet 0    finasteride (PROSCAR) 5 mg tablet Take 1 tablet (5 mg total) by mouth once daily. 90 tablet 3    finasteride (PROSCAR) 5 mg tablet Take 1 tablet (5 mg total) by mouth once daily. 90 tablet 3     No facility-administered encounter medications on file as of 3/21/2025.

## 2025-06-20 DIAGNOSIS — N28.1 COMPLEX RENAL CYST: Primary | ICD-10-CM

## 2025-06-23 ENCOUNTER — LAB VISIT (OUTPATIENT)
Dept: LAB | Facility: HOSPITAL | Age: 85
End: 2025-06-23
Attending: UROLOGY
Payer: MEDICARE

## 2025-06-23 DIAGNOSIS — N28.1 COMPLEX RENAL CYST: ICD-10-CM

## 2025-06-23 DIAGNOSIS — C61 PROSTATE CANCER: ICD-10-CM

## 2025-06-23 LAB
CREAT SERPL-MCNC: 0.7 MG/DL (ref 0.5–1.4)
GFR SERPLBLD CREATININE-BSD FMLA CKD-EPI: >60 ML/MIN/1.73/M2
PSA SERPL-MCNC: 1.87 NG/ML

## 2025-06-23 PROCEDURE — 36415 COLL VENOUS BLD VENIPUNCTURE: CPT | Mod: PO

## 2025-06-23 PROCEDURE — 82565 ASSAY OF CREATININE: CPT

## 2025-06-23 PROCEDURE — 84153 ASSAY OF PSA TOTAL: CPT

## 2025-06-24 ENCOUNTER — RESULTS FOLLOW-UP (OUTPATIENT)
Dept: UROLOGY | Facility: HOSPITAL | Age: 85
End: 2025-06-24

## 2025-06-27 ENCOUNTER — HOSPITAL ENCOUNTER (OUTPATIENT)
Dept: RADIOLOGY | Facility: HOSPITAL | Age: 85
Discharge: HOME | End: 2025-06-27
Attending: UROLOGY
Payer: MEDICARE

## 2025-06-27 DIAGNOSIS — N28.1 COMPLEX RENAL CYST: ICD-10-CM

## 2025-06-27 DIAGNOSIS — K86.9 PANCREATIC LESION: ICD-10-CM

## 2025-06-27 PROCEDURE — A9585 GADOBUTROL INJECTION: HCPCS | Performed by: UROLOGY

## 2025-06-27 PROCEDURE — 74183 MRI ABD W/O CNTR FLWD CNTR: CPT | Mod: 26,,, | Performed by: RADIOLOGY

## 2025-06-27 PROCEDURE — 25500020 PHARM REV CODE 255: Performed by: UROLOGY

## 2025-06-27 PROCEDURE — 74183 MRI ABD W/O CNTR FLWD CNTR: CPT | Mod: TC

## 2025-06-27 RX ORDER — GADOBUTROL 604.72 MG/ML
10 INJECTION INTRAVENOUS
Status: COMPLETED | OUTPATIENT
Start: 2025-06-27 | End: 2025-06-27

## 2025-06-27 RX ADMIN — GADOBUTROL 10 ML: 604.72 INJECTION INTRAVENOUS at 09:06

## 2025-06-30 ENCOUNTER — OFFICE VISIT (OUTPATIENT)
Facility: CLINIC | Age: 85
End: 2025-06-30
Payer: MEDICARE

## 2025-06-30 VITALS
BODY MASS INDEX: 35.3 KG/M2 | SYSTOLIC BLOOD PRESSURE: 122 MMHG | DIASTOLIC BLOOD PRESSURE: 72 MMHG | WEIGHT: 218.69 LBS | HEART RATE: 76 BPM | RESPIRATION RATE: 16 BRPM

## 2025-06-30 DIAGNOSIS — N28.1 COMPLEX RENAL CYST: ICD-10-CM

## 2025-06-30 DIAGNOSIS — R35.1 NOCTURIA: Primary | ICD-10-CM

## 2025-06-30 DIAGNOSIS — C61 PROSTATE CANCER: ICD-10-CM

## 2025-06-30 DIAGNOSIS — R31.29 OTHER MICROSCOPIC HEMATURIA: ICD-10-CM

## 2025-06-30 LAB
BILIRUB UR QL STRIP: POSITIVE
GLUCOSE UR QL STRIP: NEGATIVE
KETONES UR QL STRIP: POSITIVE
LEUKOCYTE ESTERASE UR QL STRIP: POSITIVE
PH, POC UA: 5
POC BLOOD, URINE: POSITIVE
POC NITRATES, URINE: NEGATIVE
PROT UR QL STRIP: POSITIVE
SP GR UR STRIP: 1.02 (ref 1–1.03)
UROBILINOGEN UR STRIP-ACNC: 0.2 (ref 0.3–2.2)

## 2025-06-30 PROCEDURE — 1101F PT FALLS ASSESS-DOCD LE1/YR: CPT | Mod: CPTII,S$GLB,, | Performed by: UROLOGY

## 2025-06-30 PROCEDURE — 99214 OFFICE O/P EST MOD 30 MIN: CPT | Mod: S$GLB,,, | Performed by: UROLOGY

## 2025-06-30 PROCEDURE — 3074F SYST BP LT 130 MM HG: CPT | Mod: CPTII,S$GLB,, | Performed by: UROLOGY

## 2025-06-30 PROCEDURE — 99999 PR PBB SHADOW E&M-EST. PATIENT-LVL III: CPT | Mod: PBBFAC,,, | Performed by: UROLOGY

## 2025-06-30 PROCEDURE — 1126F AMNT PAIN NOTED NONE PRSNT: CPT | Mod: CPTII,S$GLB,, | Performed by: UROLOGY

## 2025-06-30 PROCEDURE — 3078F DIAST BP <80 MM HG: CPT | Mod: CPTII,S$GLB,, | Performed by: UROLOGY

## 2025-06-30 PROCEDURE — 3288F FALL RISK ASSESSMENT DOCD: CPT | Mod: CPTII,S$GLB,, | Performed by: UROLOGY

## 2025-06-30 PROCEDURE — 81003 URINALYSIS AUTO W/O SCOPE: CPT | Mod: QW,S$GLB,, | Performed by: UROLOGY

## 2025-06-30 PROCEDURE — 1159F MED LIST DOCD IN RCRD: CPT | Mod: CPTII,S$GLB,, | Performed by: UROLOGY

## 2025-06-30 NOTE — PROGRESS NOTES
Subjective:       Patient ID: Mason Holt is a 84 y.o. male.    Chief Complaint: Follow-up      History of Present Illness:     Mr Holt has nocturia X 2-3.  He has BPH without significant LUTS at this time.  He says he is still taking finasteride 5 mg daily.  The patient did not want to undergo a PVR today.  PVR on 3-21-25 was 144 ml.  Normal urinary flow.  He feels that he is emptying his bladder.  No gross hematuria.  No dysuria.    He has a history of a transurethral laser vaporization of his prostate by me on 9-17-24.  History of a TURP in 2017 by an outside Urologist, Dr Gurjit Reyes in [place], [place].  30 grams of prostate tissue in the specimen and no prostate cancer was seen.    He has persistent microscopic hematuria.  Trace amount of MH on 3-21-25 and today on 6-30-25.  History of intermittent gross hematuria that started in mid August 2024 has resolved after his transurethral laser vaporization of his prostate by me on 9-17-24.    He underwent a MRI abdomen with and without IV contrast on 6-27-25 showed scattered small simple/Bosniak 1 and mildly complex hemorrhagic/proteinaceous Bosniak 2 renal cyst bilaterally. No suspicious enhancing lesions. Findings appear similar. Largest cyst in the left kidney approximately 2.5 cm.     He has low grade prostate cancer and he has been on active surveillance since 2010.  Prostate biopsy by an outside Urologist in 2010 showed a single core of jhon 3+2=5.         Past Medical History:   Diagnosis Date    BPH (benign prostatic hyperplasia)      Family History   Problem Relation Name Age of Onset    No Known Problems Father      No Known Problems Mother      No Known Problems Maternal Aunt      No Known Problems Maternal Uncle      No Known Problems Paternal Aunt      No Known Problems Paternal Uncle      No Known Problems Paternal Grandmother      No Known Problems Paternal Grandfather      No Known Problems Maternal Grandmother      No Known Problems  Maternal Grandfather       Social History[1]  Encounter Medications[2]     Review of Systems   Constitutional:  Negative for chills and fever.   Respiratory:  Negative for shortness of breath.    Cardiovascular:  Negative for chest pain.   Gastrointestinal:  Negative for nausea and vomiting.   Genitourinary:  Negative for difficulty urinating, dysuria and hematuria.   Musculoskeletal:  Negative for back pain.   Skin:  Negative for rash.   Neurological:  Negative for dizziness.   Psychiatric/Behavioral:  Negative for agitation.        Objective:     /72 (Patient Position: Sitting)   Pulse 76   Resp 16   Wt 99.2 kg (218 lb 11.1 oz)   BMI 35.30 kg/m²     Physical Exam  Constitutional:       Appearance: Normal appearance.   Pulmonary:      Effort: Pulmonary effort is normal.   Abdominal:      Palpations: Abdomen is soft.   Neurological:      Mental Status: He is alert and oriented to person, place, and time.   Psychiatric:         Mood and Affect: Mood normal.         Office Visit on 06/30/2025   Component Date Value Ref Range Status    POC Blood, Urine 06/30/2025 Positive (A)  Negative Final    POC Bilirubin, Urine 06/30/2025 Positive (A)  Negative Final    POC Urobilinogen, Urine 06/30/2025 0.2 (A)  0.3 - 2.2 Final    POC Ketones, Urine 06/30/2025 Positive (A)  Negative Final    POC Protein, Urine 06/30/2025 Positive (A)  Negative Final    POC Nitrates, Urine 06/30/2025 Negative  Negative Final    POC Glucose, Urine 06/30/2025 Negative  Negative Final    pH, UA 06/30/2025 5.0   Final    POC Specific Gravity, Urine 06/30/2025 1.020  1.003 - 1.029 Final    POC Leukocytes, Urine 06/30/2025 Positive (A)  Negative Final        Results for orders placed or performed in visit on 08/15/24 (from the past 8760 hours)   POCT Bladder Scan   Result Value    POC Residual Urine Volume 95        Assessment:       1. Nocturia    2. Complex renal cyst    3. Other microscopic hematuria    4. Prostate cancer        Plan:      Orders Placed This Encounter    US Retroperitoneal Complete    Prostate Specific Antigen, Diagnostic    POCT Urinalysis, Dipstick, Automated, W/O Scope        6-27-25  MRI abdomen with and without IV contrast.  See report.  Again noted are scattered small simple/Bosniak 1 and mildly complex hemorrhagic/proteinaceous Bosniak 2 renal cyst bilaterally. No suspicious enhancing lesions. Findings appear similar. Largest cyst in the left kidney approximately 2.5 cm.  Stable pancreatic body cyst from the most recent examination though increased from an older 2024 examination. Pancreatic head cyst measures minimally smaller. Consider one-year follow-up pancreas MRI.      6-10-24  MRI abdomen with and without IV contrast.  BRG.  See report.  Bilateral benign simple and proteinaceous renal cysts measuring up to 2.4 cm at the upper pole of the left kidney. The largest proteinaceous cysts are at the upper pole of the right kidney measuring up to 1.2 cm. No hydronephrosis.  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 Recommend one-year surveillance MRI.      10-31-23  CT abdomen/pelvis with IV contrast.  OLOL.  See report.  No suspicious renal lesion is noted. There are postop changes of TURP.  No pelvic mass is detected.      7-25-23  CT urogram.  MARIA INES.  See report.  5 mm non obstructing stone within the right ureterovesical junction.  A couple of tiny punctate calyceal stones are visible in both kidneys.  Multiple cysts of varying size and proteinaceous content on both kidneys.  No definitive solid renal mass. Markedly enlarged prostate gland.     I reviewed the above imaging results.           6-23-25  PSA 1.87     12-30-24  PSA 2.7     1-3-24  PSA 3.67     12-13-23  PSA 3.6     7-19-23  PSA 3.1    6-23-25  Cr 0.7.  GFR >60.     8-30-24  Cr 0.9.  GFR >60.     1-25-24  Cr 0.72     1-3-24  HgbA1c 5.9     1-6-25  Urine culture.  Multiple organisms isolated. None in predominance.       12-18-24  Urine culture.  No growth.     10-7-24  Urine culture.  Multiple organisms isolated. None in predominance.      I reviewed the above lab results.            Assessment:  - Nocturia.  - BPH without significant LUTS at this time.  The patient did not want to undergo a PVR today.  PVR on 3-21-25 was 144 ml.  History of a transurethral laser vaporization of his prostate by me on 9-17-24.  History of a TURP in 2017 by an outside Urologist, Dr Gurjit Reyes in [place], [place].  30 grams of prostate tissue in the specimen and no prostate cancer was seen.  - Persistent microscopic hematuria.  Trace amount of MH on 3-21-25 and today on 6-30-25.  History of intermittent gross hematuria that started in mid August 2024 has resolved after his transurethral laser vaporization of his prostate by me on 9-17-24.  - Cystoscopy on 8-26-24 showed an obstructing and vascular appearing trilobar hypertrophy prostate.  He is not a smoker and he has no smoking history.  He is not taking a blood thinner.  - MRI abdomen with and without IV contrast on 6-27-25 showed scattered small simple/Bosniak 1 and mildly complex hemorrhagic/proteinaceous Bosniak 2 renal cyst bilaterally. No suspicious enhancing lesions. Findings appear similar. Largest cyst in the left kidney approximately 2.5 cm.   - Low grade prostate cancer.  He has been on active surveillance since 2010.  Prostate biopsy by an outside Urologist in 2010 showed a single core of jhon 3+2=5.  - History of a CT urogram on 7-25-23 that showed a 5 mm non obstructing stone within the right ureterovesical junction.  A couple of tiny punctate calyceal stones are visible in both kidneys.  S/p right USE with laser lithotripsy, cystoscopy, right ureteral stent placement by me on 8-2-23.  His right ureteral stent was removed cystoscopically by me on 8-9-23.  - History of kidney stones.     Plan:  - I discussed his recent MRI abdomen results with the patient today and the mutual decision  was made to continue with observation of his bilateral renal cysts with a renal ultrasound in 1 year.  - The mutual decision was made to continue with active surveillance of his prostate cancer with a repeat PSA in 1 year.  - Observation of his trace amount of MH.  - Continue finasteride 5 mg 1 PO qdaily that was started on 9-9-24.  - I discussed dietary modifications with him today and I recommended he drink mostly water during the day.  - I recommended he limit his fluid intake at night to small amounts of water and to void just prior to bedtime.   - He says he is following with Dr Paul Gallo regarding his pancreatic cysts.  - PSA and renal ultrasound both in 1 year a few days prior to a 1 year appointment.    - RTC in 1 year or sooner as needed.                       [1]   Social History  Socioeconomic History    Marital status:    Tobacco Use    Smoking status: Never    Smokeless tobacco: Never   Substance and Sexual Activity    Alcohol use: Never    Drug use: Never    Sexual activity: Yes     Partners: Female     Social Drivers of Health     Financial Resource Strain: Low Risk  (1/4/2025)    Overall Financial Resource Strain (CARDIA)     Difficulty of Paying Living Expenses: Not very hard   Food Insecurity: No Food Insecurity (1/4/2025)    Hunger Vital Sign     Worried About Running Out of Food in the Last Year: Never true     Ran Out of Food in the Last Year: Never true   Transportation Needs: No Transportation Needs (8/1/2023)    Received from [facility] and [facility]    [facility] [place]     Lack of Transportation (Medical): No     Lack of Transportation (Non-Medical): No   Physical Activity: Sufficiently Active (1/4/2025)    Exercise Vital Sign     Days of Exercise per Week: 6 days     Minutes of Exercise per Session: 30 min   Stress: No Stress Concern Present (1/4/2025)    [employer] - Occupational  Stress Questionnaire     Feeling of Stress : Not at all   Housing Stability: Unknown (2025)    Housing Stability Vital Sign     Unable to Pay for Housing in the Last Year: No   [2]   Outpatient Encounter Medications as of 2025   Medication Sig Dispense Refill    finasteride (PROSCAR) 5 mg tablet Take 1 tablet (5 mg total) by mouth once daily. 90 tablet 3    finasteride (PROSCAR) 5 mg tablet Take 1 tablet (5 mg total) by mouth once daily. 90 tablet 3    ciprofloxacin HCl (CIPRO) 500 MG tablet Take 1 tablet (500 mg total) by mouth 2 (two) times daily. (Patient not taking: Reported on 2025) 28 tablet 0    [] gadobutroL (GADAVIST) injection 10 mL        No facility-administered encounter medications on file as of 2025.